# Patient Record
Sex: FEMALE | Race: WHITE | Employment: OTHER | URBAN - METROPOLITAN AREA
[De-identification: names, ages, dates, MRNs, and addresses within clinical notes are randomized per-mention and may not be internally consistent; named-entity substitution may affect disease eponyms.]

---

## 2018-07-24 ENCOUNTER — HOSPITAL ENCOUNTER (OUTPATIENT)
Dept: PREADMISSION TESTING | Age: 63
Discharge: HOME | End: 2018-07-24
Payer: COMMERCIAL

## 2018-07-24 VITALS — BODY MASS INDEX: 36.88 KG/M2 | HEIGHT: 67 IN | WEIGHT: 235 LBS

## 2018-07-24 LAB
ANION GAP SERPL CALC-SCNC: 7 MMOL/L (ref 3–18)
BUN SERPL-MCNC: 18 MG/DL (ref 7–18)
BUN/CREAT SERPL: 20 (ref 12–20)
CALCIUM SERPL-MCNC: 9.3 MG/DL (ref 8.5–10.1)
CHLORIDE SERPL-SCNC: 108 MMOL/L (ref 100–108)
CO2 SERPL-SCNC: 29 MMOL/L (ref 21–32)
CREAT SERPL-MCNC: 0.92 MG/DL (ref 0.6–1.3)
GLUCOSE SERPL-MCNC: 100 MG/DL (ref 74–99)
HCT VFR BLD AUTO: 33.7 % (ref 35–45)
HGB BLD-MCNC: 10.3 G/DL (ref 12–16)
POTASSIUM SERPL-SCNC: 3.5 MMOL/L (ref 3.5–5.5)
SODIUM SERPL-SCNC: 144 MMOL/L (ref 136–145)

## 2018-07-24 PROCEDURE — 93005 ELECTROCARDIOGRAM TRACING: CPT

## 2018-07-24 PROCEDURE — 85018 HEMOGLOBIN: CPT | Performed by: SURGERY

## 2018-07-24 PROCEDURE — 80048 BASIC METABOLIC PNL TOTAL CA: CPT | Performed by: SURGERY

## 2018-07-24 RX ORDER — CETIRIZINE HCL 10 MG
10 TABLET ORAL DAILY
COMMUNITY

## 2018-07-24 RX ORDER — OLMESARTAN MEDOXOMIL AND HYDROCHLOROTHIAZIDE 40/12.5 40; 12.5 MG/1; MG/1
1 TABLET ORAL DAILY
COMMUNITY

## 2018-07-24 RX ORDER — NAPROXEN SODIUM 220 MG
220 TABLET ORAL
COMMUNITY
End: 2022-04-13

## 2018-07-24 RX ORDER — ESOMEPRAZOLE MAGNESIUM 40 MG/1
20 CAPSULE, DELAYED RELEASE ORAL AS NEEDED
COMMUNITY

## 2018-07-24 RX ORDER — ACETAMINOPHEN 500 MG
1000 TABLET ORAL
COMMUNITY

## 2018-07-24 RX ORDER — SODIUM CHLORIDE, SODIUM LACTATE, POTASSIUM CHLORIDE, CALCIUM CHLORIDE 600; 310; 30; 20 MG/100ML; MG/100ML; MG/100ML; MG/100ML
125 INJECTION, SOLUTION INTRAVENOUS CONTINUOUS
Status: CANCELLED | OUTPATIENT
Start: 2018-07-24

## 2018-07-24 RX ORDER — HEPARIN SODIUM 5000 [USP'U]/ML
5000 INJECTION, SOLUTION INTRAVENOUS; SUBCUTANEOUS ONCE
Status: CANCELLED | OUTPATIENT
Start: 2018-07-24 | End: 2018-07-24

## 2018-07-24 RX ORDER — SENNOSIDES 8.6 MG/1
5 TABLET ORAL DAILY
COMMUNITY
End: 2019-12-10

## 2018-07-24 RX ORDER — SERTRALINE HYDROCHLORIDE 25 MG/1
25 TABLET, FILM COATED ORAL DAILY
COMMUNITY
End: 2022-04-13

## 2018-07-25 LAB
ATRIAL RATE: 73 BPM
CALCULATED P AXIS, ECG09: 60 DEGREES
CALCULATED R AXIS, ECG10: 18 DEGREES
CALCULATED T AXIS, ECG11: 42 DEGREES
DIAGNOSIS, 93000: NORMAL
P-R INTERVAL, ECG05: 190 MS
Q-T INTERVAL, ECG07: 402 MS
QRS DURATION, ECG06: 92 MS
QTC CALCULATION (BEZET), ECG08: 442 MS
VENTRICULAR RATE, ECG03: 73 BPM

## 2018-08-12 ENCOUNTER — ANESTHESIA EVENT (OUTPATIENT)
Dept: SURGERY | Age: 63
DRG: 331 | End: 2018-08-12
Payer: COMMERCIAL

## 2018-08-13 ENCOUNTER — ANESTHESIA (OUTPATIENT)
Dept: SURGERY | Age: 63
DRG: 331 | End: 2018-08-13
Payer: COMMERCIAL

## 2018-08-13 ENCOUNTER — HOSPITAL ENCOUNTER (INPATIENT)
Age: 63
LOS: 2 days | Discharge: HOME | DRG: 331 | End: 2018-08-15
Attending: SURGERY | Admitting: SURGERY
Payer: COMMERCIAL

## 2018-08-13 PROBLEM — K56.699 STRICTURE OF SIGMOID COLON (HCC): Status: ACTIVE | Noted: 2018-08-13

## 2018-08-13 PROBLEM — M19.90 ARTHRITIS: Status: ACTIVE | Noted: 2018-08-13

## 2018-08-13 PROBLEM — E78.5 HYPERLIPIDEMIA: Status: ACTIVE | Noted: 2018-08-13

## 2018-08-13 PROBLEM — Z98.84 HISTORY OF GASTRIC RESTRICTIVE SURGERY: Status: ACTIVE | Noted: 2018-08-13

## 2018-08-13 PROBLEM — I10 ESSENTIAL HYPERTENSION: Status: ACTIVE | Noted: 2018-08-13

## 2018-08-13 PROBLEM — M79.7 FIBROMYALGIA: Status: ACTIVE | Noted: 2018-08-13

## 2018-08-13 PROBLEM — K21.9 GERD (GASTROESOPHAGEAL REFLUX DISEASE): Status: ACTIVE | Noted: 2018-08-13

## 2018-08-13 LAB
ABO + RH BLD: NORMAL
BLOOD GROUP ANTIBODIES SERPL: NORMAL
SPECIMEN EXP DATE BLD: NORMAL

## 2018-08-13 PROCEDURE — 77030038020 HC MANFLD NEPTUNE STRY -B: Performed by: SURGERY

## 2018-08-13 PROCEDURE — 77030018719 HC DRSG PTCH ANTIMIC J&J -A: Performed by: SURGERY

## 2018-08-13 PROCEDURE — 74011000258 HC RX REV CODE- 258: Performed by: SURGERY

## 2018-08-13 PROCEDURE — 74011250636 HC RX REV CODE- 250/636: Performed by: ANESTHESIOLOGY

## 2018-08-13 PROCEDURE — 77030026102 HC DEV TISS ENSEAL G2 J&J -F: Performed by: SURGERY

## 2018-08-13 PROCEDURE — 74011250637 HC RX REV CODE- 250/637: Performed by: SURGERY

## 2018-08-13 PROCEDURE — 77030009978 HC RELD STPLR TCR J&J -B: Performed by: SURGERY

## 2018-08-13 PROCEDURE — 77030018836 HC SOL IRR NACL ICUM -A: Performed by: SURGERY

## 2018-08-13 PROCEDURE — 77030011264 HC ELECTRD BLD EXT COVD -A: Performed by: SURGERY

## 2018-08-13 PROCEDURE — 77030010030: Performed by: SURGERY

## 2018-08-13 PROCEDURE — 77030027876 HC STPLR ENDOSC FLX PWR J&J -G1: Performed by: SURGERY

## 2018-08-13 PROCEDURE — 76210000006 HC OR PH I REC 0.5 TO 1 HR: Performed by: SURGERY

## 2018-08-13 PROCEDURE — 74011250636 HC RX REV CODE- 250/636: Performed by: SURGERY

## 2018-08-13 PROCEDURE — 74011000250 HC RX REV CODE- 250: Performed by: SURGERY

## 2018-08-13 PROCEDURE — 88304 TISSUE EXAM BY PATHOLOGIST: CPT | Performed by: SURGERY

## 2018-08-13 PROCEDURE — 74011250636 HC RX REV CODE- 250/636

## 2018-08-13 PROCEDURE — 86900 BLOOD TYPING SEROLOGIC ABO: CPT | Performed by: SURGERY

## 2018-08-13 PROCEDURE — 77030013079 HC BLNKT BAIR HGGR 3M -A: Performed by: ANESTHESIOLOGY

## 2018-08-13 PROCEDURE — 77030032490 HC SLV COMPR SCD KNE COVD -B: Performed by: SURGERY

## 2018-08-13 PROCEDURE — 76010000137 HC OR TIME 5 TO 5.5 HR: Performed by: SURGERY

## 2018-08-13 PROCEDURE — 77010033678 HC OXYGEN DAILY

## 2018-08-13 PROCEDURE — 77030008477 HC STYL SATN SLP COVD -A: Performed by: ANESTHESIOLOGY

## 2018-08-13 PROCEDURE — 77030034849: Performed by: SURGERY

## 2018-08-13 PROCEDURE — 77030016151 HC PROTCTR LNS DFOG COVD -B: Performed by: SURGERY

## 2018-08-13 PROCEDURE — 65270000029 HC RM PRIVATE

## 2018-08-13 PROCEDURE — 77030013567 HC DRN WND RESERV BARD -A: Performed by: SURGERY

## 2018-08-13 PROCEDURE — 77030008602 HC TRCR ENDOSC EPATH J&J -B: Performed by: SURGERY

## 2018-08-13 PROCEDURE — 77030009527 HC GEL PRT SYS AMR -E: Performed by: SURGERY

## 2018-08-13 PROCEDURE — 77030016154: Performed by: SURGERY

## 2018-08-13 PROCEDURE — 36415 COLL VENOUS BLD VENIPUNCTURE: CPT | Performed by: SURGERY

## 2018-08-13 PROCEDURE — 0DBW0ZZ EXCISION OF PERITONEUM, OPEN APPROACH: ICD-10-PCS | Performed by: SURGERY

## 2018-08-13 PROCEDURE — 77030022703 HC LIGASURE  BLNT LAPSCP SEAL COVD -E: Performed by: SURGERY

## 2018-08-13 PROCEDURE — 77030008518 HC TBNG INSUF ENDO STRY -B: Performed by: SURGERY

## 2018-08-13 PROCEDURE — 77030025303 HC STPLR ENDOSC J&J -G: Performed by: SURGERY

## 2018-08-13 PROCEDURE — 77030012407 HC DRN WND BARD -B: Performed by: SURGERY

## 2018-08-13 PROCEDURE — 77030003029 HC SUT VCRL J&J -B: Performed by: SURGERY

## 2018-08-13 PROCEDURE — 76060000041 HC ANESTHESIA 5 TO 5.5 HR: Performed by: SURGERY

## 2018-08-13 PROCEDURE — 88305 TISSUE EXAM BY PATHOLOGIST: CPT | Performed by: SURGERY

## 2018-08-13 PROCEDURE — 77030037875 HC DRSG MEPILEX <16IN BORD MOLN -A: Performed by: SURGERY

## 2018-08-13 PROCEDURE — 77030002986 HC SUT PROL J&J -A: Performed by: SURGERY

## 2018-08-13 PROCEDURE — 77030036598 HC CARTDRG STPL RELD ECHELON FLX J&J -D: Performed by: SURGERY

## 2018-08-13 PROCEDURE — 77030027138 HC INCENT SPIROMETER -A

## 2018-08-13 PROCEDURE — 77030020782 HC GWN BAIR PAWS FLX 3M -B: Performed by: SURGERY

## 2018-08-13 PROCEDURE — 0DTN0ZZ RESECTION OF SIGMOID COLON, OPEN APPROACH: ICD-10-PCS | Performed by: SURGERY

## 2018-08-13 PROCEDURE — 77030011256 HC DRSG MEPILEX <16IN NO BORD MOLN -A: Performed by: SURGERY

## 2018-08-13 PROCEDURE — 77030011267 HC ELECTRD BLD COVD -A: Performed by: SURGERY

## 2018-08-13 PROCEDURE — C9290 INJ, BUPIVACAINE LIPOSOME: HCPCS | Performed by: SURGERY

## 2018-08-13 PROCEDURE — 77030031139 HC SUT VCRL2 J&J -A: Performed by: SURGERY

## 2018-08-13 PROCEDURE — 77030009403 HC ELECTRD ENDO MEGA -B: Performed by: SURGERY

## 2018-08-13 PROCEDURE — 77030018521 HC STPLR ENDOSCOPIC J&J -C: Performed by: SURGERY

## 2018-08-13 PROCEDURE — 0WQF4ZZ REPAIR ABDOMINAL WALL, PERCUTANEOUS ENDOSCOPIC APPROACH: ICD-10-PCS | Performed by: SURGERY

## 2018-08-13 PROCEDURE — 77030005521 HC CATH URETH FOL38 BARD -B: Performed by: SURGERY

## 2018-08-13 PROCEDURE — 88307 TISSUE EXAM BY PATHOLOGIST: CPT | Performed by: SURGERY

## 2018-08-13 PROCEDURE — 77030033138 HC SUT PGA STRATFX J&J -B: Performed by: SURGERY

## 2018-08-13 PROCEDURE — 77030002966 HC SUT PDS J&J -A: Performed by: SURGERY

## 2018-08-13 PROCEDURE — 88302 TISSUE EXAM BY PATHOLOGIST: CPT | Performed by: SURGERY

## 2018-08-13 PROCEDURE — 77030008603 HC TRCR ENDOSC EPATH J&J -C: Performed by: SURGERY

## 2018-08-13 PROCEDURE — 74011000250 HC RX REV CODE- 250

## 2018-08-13 PROCEDURE — 0DJD8ZZ INSPECTION OF LOWER INTESTINAL TRACT, VIA NATURAL OR ARTIFICIAL OPENING ENDOSCOPIC: ICD-10-PCS | Performed by: SURGERY

## 2018-08-13 PROCEDURE — 77030008683 HC TU ET CUF COVD -A: Performed by: ANESTHESIOLOGY

## 2018-08-13 RX ORDER — MIDAZOLAM HYDROCHLORIDE 1 MG/ML
INJECTION, SOLUTION INTRAMUSCULAR; INTRAVENOUS AS NEEDED
Status: DISCONTINUED | OUTPATIENT
Start: 2018-08-13 | End: 2018-08-13 | Stop reason: HOSPADM

## 2018-08-13 RX ORDER — HYDROMORPHONE HYDROCHLORIDE 2 MG/ML
INJECTION, SOLUTION INTRAMUSCULAR; INTRAVENOUS; SUBCUTANEOUS AS NEEDED
Status: DISCONTINUED | OUTPATIENT
Start: 2018-08-13 | End: 2018-08-13 | Stop reason: HOSPADM

## 2018-08-13 RX ORDER — HEPARIN SODIUM 5000 [USP'U]/ML
5000 INJECTION, SOLUTION INTRAVENOUS; SUBCUTANEOUS ONCE
Status: COMPLETED | OUTPATIENT
Start: 2018-08-13 | End: 2018-08-13

## 2018-08-13 RX ORDER — HEPARIN SODIUM 5000 [USP'U]/ML
5000 INJECTION, SOLUTION INTRAVENOUS; SUBCUTANEOUS EVERY 8 HOURS
Status: DISCONTINUED | OUTPATIENT
Start: 2018-08-13 | End: 2018-08-16 | Stop reason: HOSPADM

## 2018-08-13 RX ORDER — EPHEDRINE SULFATE/0.9% NACL/PF 25 MG/5 ML
SYRINGE (ML) INTRAVENOUS AS NEEDED
Status: DISCONTINUED | OUTPATIENT
Start: 2018-08-13 | End: 2018-08-13 | Stop reason: HOSPADM

## 2018-08-13 RX ORDER — OXYCODONE HYDROCHLORIDE 5 MG/1
5 TABLET ORAL ONCE
Status: DISCONTINUED | OUTPATIENT
Start: 2018-08-13 | End: 2018-08-13 | Stop reason: HOSPADM

## 2018-08-13 RX ORDER — FENTANYL CITRATE 50 UG/ML
INJECTION, SOLUTION INTRAMUSCULAR; INTRAVENOUS AS NEEDED
Status: DISCONTINUED | OUTPATIENT
Start: 2018-08-13 | End: 2018-08-13 | Stop reason: HOSPADM

## 2018-08-13 RX ORDER — LIDOCAINE HYDROCHLORIDE 20 MG/ML
INJECTION, SOLUTION EPIDURAL; INFILTRATION; INTRACAUDAL; PERINEURAL AS NEEDED
Status: DISCONTINUED | OUTPATIENT
Start: 2018-08-13 | End: 2018-08-13 | Stop reason: HOSPADM

## 2018-08-13 RX ORDER — FENTANYL CITRATE 50 UG/ML
25 INJECTION, SOLUTION INTRAMUSCULAR; INTRAVENOUS AS NEEDED
Status: DISCONTINUED | OUTPATIENT
Start: 2018-08-13 | End: 2018-08-13 | Stop reason: HOSPADM

## 2018-08-13 RX ORDER — NALOXONE HYDROCHLORIDE 0.4 MG/ML
0.1 INJECTION, SOLUTION INTRAMUSCULAR; INTRAVENOUS; SUBCUTANEOUS AS NEEDED
Status: DISCONTINUED | OUTPATIENT
Start: 2018-08-13 | End: 2018-08-13 | Stop reason: HOSPADM

## 2018-08-13 RX ORDER — ALBUTEROL SULFATE 0.83 MG/ML
2.5 SOLUTION RESPIRATORY (INHALATION) AS NEEDED
Status: DISCONTINUED | OUTPATIENT
Start: 2018-08-13 | End: 2018-08-13 | Stop reason: HOSPADM

## 2018-08-13 RX ORDER — SODIUM CHLORIDE 0.9 % (FLUSH) 0.9 %
5-10 SYRINGE (ML) INJECTION AS NEEDED
Status: DISCONTINUED | OUTPATIENT
Start: 2018-08-13 | End: 2018-08-16 | Stop reason: HOSPADM

## 2018-08-13 RX ORDER — MAGNESIUM SULFATE 100 %
4 CRYSTALS MISCELLANEOUS AS NEEDED
Status: DISCONTINUED | OUTPATIENT
Start: 2018-08-13 | End: 2018-08-13 | Stop reason: HOSPADM

## 2018-08-13 RX ORDER — SODIUM CHLORIDE 0.9 % (FLUSH) 0.9 %
5-10 SYRINGE (ML) INJECTION AS NEEDED
Status: DISCONTINUED | OUTPATIENT
Start: 2018-08-13 | End: 2018-08-13 | Stop reason: HOSPADM

## 2018-08-13 RX ORDER — DIPHENHYDRAMINE HYDROCHLORIDE 50 MG/ML
12.5 INJECTION, SOLUTION INTRAMUSCULAR; INTRAVENOUS
Status: DISCONTINUED | OUTPATIENT
Start: 2018-08-13 | End: 2018-08-13 | Stop reason: HOSPADM

## 2018-08-13 RX ORDER — ONDANSETRON 2 MG/ML
4 INJECTION INTRAMUSCULAR; INTRAVENOUS ONCE
Status: DISCONTINUED | OUTPATIENT
Start: 2018-08-13 | End: 2018-08-13 | Stop reason: HOSPADM

## 2018-08-13 RX ORDER — KETOROLAC TROMETHAMINE 30 MG/ML
30 INJECTION, SOLUTION INTRAMUSCULAR; INTRAVENOUS EVERY 6 HOURS
Status: DISCONTINUED | OUTPATIENT
Start: 2018-08-13 | End: 2018-08-16 | Stop reason: HOSPADM

## 2018-08-13 RX ORDER — GABAPENTIN 100 MG/1
200 CAPSULE ORAL 3 TIMES DAILY
Status: DISCONTINUED | OUTPATIENT
Start: 2018-08-13 | End: 2018-08-16 | Stop reason: HOSPADM

## 2018-08-13 RX ORDER — SODIUM CHLORIDE 0.9 % (FLUSH) 0.9 %
5-10 SYRINGE (ML) INJECTION EVERY 8 HOURS
Status: DISCONTINUED | OUTPATIENT
Start: 2018-08-13 | End: 2018-08-16 | Stop reason: HOSPADM

## 2018-08-13 RX ORDER — ROCURONIUM BROMIDE 10 MG/ML
INJECTION, SOLUTION INTRAVENOUS AS NEEDED
Status: DISCONTINUED | OUTPATIENT
Start: 2018-08-13 | End: 2018-08-13 | Stop reason: HOSPADM

## 2018-08-13 RX ORDER — HYDROCODONE BITARTRATE AND ACETAMINOPHEN 7.5; 325 MG/1; MG/1
1 TABLET ORAL
Status: DISCONTINUED | OUTPATIENT
Start: 2018-08-13 | End: 2018-08-13

## 2018-08-13 RX ORDER — DEXTROSE 50 % IN WATER (D50W) INTRAVENOUS SYRINGE
25-50 AS NEEDED
Status: DISCONTINUED | OUTPATIENT
Start: 2018-08-13 | End: 2018-08-13 | Stop reason: HOSPADM

## 2018-08-13 RX ORDER — INSULIN LISPRO 100 [IU]/ML
INJECTION, SOLUTION INTRAVENOUS; SUBCUTANEOUS ONCE
Status: DISCONTINUED | OUTPATIENT
Start: 2018-08-13 | End: 2018-08-13 | Stop reason: HOSPADM

## 2018-08-13 RX ORDER — KETOROLAC TROMETHAMINE 30 MG/ML
INJECTION, SOLUTION INTRAMUSCULAR; INTRAVENOUS AS NEEDED
Status: DISCONTINUED | OUTPATIENT
Start: 2018-08-13 | End: 2018-08-13 | Stop reason: HOSPADM

## 2018-08-13 RX ORDER — DEXTROSE MONOHYDRATE AND SODIUM CHLORIDE 5; .45 G/100ML; G/100ML
125 INJECTION, SOLUTION INTRAVENOUS CONTINUOUS
Status: DISCONTINUED | OUTPATIENT
Start: 2018-08-13 | End: 2018-08-14

## 2018-08-13 RX ORDER — SODIUM CHLORIDE, SODIUM LACTATE, POTASSIUM CHLORIDE, CALCIUM CHLORIDE 600; 310; 30; 20 MG/100ML; MG/100ML; MG/100ML; MG/100ML
125 INJECTION, SOLUTION INTRAVENOUS CONTINUOUS
Status: DISCONTINUED | OUTPATIENT
Start: 2018-08-13 | End: 2018-08-14

## 2018-08-13 RX ORDER — ACETAMINOPHEN 10 MG/ML
1000 INJECTION, SOLUTION INTRAVENOUS EVERY 8 HOURS
Status: COMPLETED | OUTPATIENT
Start: 2018-08-13 | End: 2018-08-15

## 2018-08-13 RX ORDER — ONDANSETRON 2 MG/ML
4 INJECTION INTRAMUSCULAR; INTRAVENOUS
Status: DISCONTINUED | OUTPATIENT
Start: 2018-08-13 | End: 2018-08-16 | Stop reason: HOSPADM

## 2018-08-13 RX ORDER — SODIUM CHLORIDE, SODIUM LACTATE, POTASSIUM CHLORIDE, CALCIUM CHLORIDE 600; 310; 30; 20 MG/100ML; MG/100ML; MG/100ML; MG/100ML
150 INJECTION, SOLUTION INTRAVENOUS CONTINUOUS
Status: DISCONTINUED | OUTPATIENT
Start: 2018-08-13 | End: 2018-08-13 | Stop reason: HOSPADM

## 2018-08-13 RX ORDER — NEOSTIGMINE METHYLSULFATE 5 MG/5 ML
SYRINGE (ML) INTRAVENOUS AS NEEDED
Status: DISCONTINUED | OUTPATIENT
Start: 2018-08-13 | End: 2018-08-13 | Stop reason: HOSPADM

## 2018-08-13 RX ORDER — ONDANSETRON 2 MG/ML
INJECTION INTRAMUSCULAR; INTRAVENOUS AS NEEDED
Status: DISCONTINUED | OUTPATIENT
Start: 2018-08-13 | End: 2018-08-13 | Stop reason: HOSPADM

## 2018-08-13 RX ORDER — GLYCOPYRROLATE 0.2 MG/ML
INJECTION INTRAMUSCULAR; INTRAVENOUS AS NEEDED
Status: DISCONTINUED | OUTPATIENT
Start: 2018-08-13 | End: 2018-08-13 | Stop reason: HOSPADM

## 2018-08-13 RX ORDER — ACETAMINOPHEN 10 MG/ML
1000 INJECTION, SOLUTION INTRAVENOUS ONCE
Status: COMPLETED | OUTPATIENT
Start: 2018-08-13 | End: 2018-08-13

## 2018-08-13 RX ORDER — KETAMINE HYDROCHLORIDE 10 MG/ML
INJECTION, SOLUTION INTRAMUSCULAR; INTRAVENOUS AS NEEDED
Status: DISCONTINUED | OUTPATIENT
Start: 2018-08-13 | End: 2018-08-13 | Stop reason: HOSPADM

## 2018-08-13 RX ORDER — PROPOFOL 10 MG/ML
INJECTION, EMULSION INTRAVENOUS AS NEEDED
Status: DISCONTINUED | OUTPATIENT
Start: 2018-08-13 | End: 2018-08-13 | Stop reason: HOSPADM

## 2018-08-13 RX ADMIN — ROCURONIUM BROMIDE 10 MG: 10 INJECTION, SOLUTION INTRAVENOUS at 11:08

## 2018-08-13 RX ADMIN — Medication 10 ML: at 23:46

## 2018-08-13 RX ADMIN — HEPARIN SODIUM 5000 UNITS: 5000 INJECTION, SOLUTION INTRAVENOUS; SUBCUTANEOUS at 07:05

## 2018-08-13 RX ADMIN — ROCURONIUM BROMIDE 10 MG: 10 INJECTION, SOLUTION INTRAVENOUS at 11:34

## 2018-08-13 RX ADMIN — SODIUM CHLORIDE, SODIUM LACTATE, POTASSIUM CHLORIDE, AND CALCIUM CHLORIDE: 600; 310; 30; 20 INJECTION, SOLUTION INTRAVENOUS at 11:45

## 2018-08-13 RX ADMIN — GABAPENTIN 200 MG: 100 CAPSULE ORAL at 22:41

## 2018-08-13 RX ADMIN — KETAMINE HYDROCHLORIDE 10 MG: 10 INJECTION, SOLUTION INTRAMUSCULAR; INTRAVENOUS at 09:44

## 2018-08-13 RX ADMIN — GLYCOPYRROLATE 0.2 MG: 0.2 INJECTION INTRAMUSCULAR; INTRAVENOUS at 08:34

## 2018-08-13 RX ADMIN — KETAMINE HYDROCHLORIDE 10 MG: 10 INJECTION, SOLUTION INTRAMUSCULAR; INTRAVENOUS at 08:34

## 2018-08-13 RX ADMIN — ROCURONIUM BROMIDE 10 MG: 10 INJECTION, SOLUTION INTRAVENOUS at 09:15

## 2018-08-13 RX ADMIN — PROPOFOL 30 MG: 10 INJECTION, EMULSION INTRAVENOUS at 12:25

## 2018-08-13 RX ADMIN — FENTANYL CITRATE 75 MCG: 50 INJECTION, SOLUTION INTRAMUSCULAR; INTRAVENOUS at 08:10

## 2018-08-13 RX ADMIN — KETAMINE HYDROCHLORIDE 10 MG: 10 INJECTION, SOLUTION INTRAMUSCULAR; INTRAVENOUS at 11:55

## 2018-08-13 RX ADMIN — MIDAZOLAM HYDROCHLORIDE 2 MG: 1 INJECTION, SOLUTION INTRAMUSCULAR; INTRAVENOUS at 08:04

## 2018-08-13 RX ADMIN — KETAMINE HYDROCHLORIDE 10 MG: 10 INJECTION, SOLUTION INTRAMUSCULAR; INTRAVENOUS at 08:43

## 2018-08-13 RX ADMIN — ROCURONIUM BROMIDE 10 MG: 10 INJECTION, SOLUTION INTRAVENOUS at 09:32

## 2018-08-13 RX ADMIN — ACETAMINOPHEN 1000 MG: 10 INJECTION, SOLUTION INTRAVENOUS at 08:07

## 2018-08-13 RX ADMIN — ONDANSETRON 4 MG: 2 INJECTION INTRAMUSCULAR; INTRAVENOUS at 12:24

## 2018-08-13 RX ADMIN — KETAMINE HYDROCHLORIDE 10 MG: 10 INJECTION, SOLUTION INTRAMUSCULAR; INTRAVENOUS at 09:18

## 2018-08-13 RX ADMIN — GABAPENTIN 200 MG: 100 CAPSULE ORAL at 17:12

## 2018-08-13 RX ADMIN — FENTANYL CITRATE 25 MCG: 50 INJECTION, SOLUTION INTRAMUSCULAR; INTRAVENOUS at 10:37

## 2018-08-13 RX ADMIN — LIDOCAINE HYDROCHLORIDE 40 MG: 20 INJECTION, SOLUTION EPIDURAL; INFILTRATION; INTRACAUDAL; PERINEURAL at 08:12

## 2018-08-13 RX ADMIN — ACETAMINOPHEN 1000 MG: 10 INJECTION, SOLUTION INTRAVENOUS at 23:43

## 2018-08-13 RX ADMIN — ROCURONIUM BROMIDE 10 MG: 10 INJECTION, SOLUTION INTRAVENOUS at 08:49

## 2018-08-13 RX ADMIN — DEXTROSE MONOHYDRATE AND SODIUM CHLORIDE 125 ML/HR: 5; .45 INJECTION, SOLUTION INTRAVENOUS at 18:13

## 2018-08-13 RX ADMIN — KETOROLAC TROMETHAMINE 30 MG: 30 INJECTION, SOLUTION INTRAMUSCULAR at 22:50

## 2018-08-13 RX ADMIN — Medication 10 ML: at 17:13

## 2018-08-13 RX ADMIN — KETAMINE HYDROCHLORIDE 10 MG: 10 INJECTION, SOLUTION INTRAMUSCULAR; INTRAVENOUS at 12:30

## 2018-08-13 RX ADMIN — FENTANYL CITRATE 25 MCG: 50 INJECTION, SOLUTION INTRAMUSCULAR; INTRAVENOUS at 09:17

## 2018-08-13 RX ADMIN — Medication 3 MG: at 12:53

## 2018-08-13 RX ADMIN — ROCURONIUM BROMIDE 10 MG: 10 INJECTION, SOLUTION INTRAVENOUS at 10:03

## 2018-08-13 RX ADMIN — PROPOFOL 150 MG: 10 INJECTION, EMULSION INTRAVENOUS at 08:12

## 2018-08-13 RX ADMIN — HEPARIN SODIUM 5000 UNITS: 5000 INJECTION INTRAVENOUS; SUBCUTANEOUS at 23:00

## 2018-08-13 RX ADMIN — KETOROLAC TROMETHAMINE 30 MG: 30 INJECTION, SOLUTION INTRAMUSCULAR; INTRAVENOUS at 12:32

## 2018-08-13 RX ADMIN — SODIUM CHLORIDE 1 G: 900 INJECTION INTRAVENOUS at 08:13

## 2018-08-13 RX ADMIN — ROCURONIUM BROMIDE 20 MG: 10 INJECTION, SOLUTION INTRAVENOUS at 08:33

## 2018-08-13 RX ADMIN — KETAMINE HYDROCHLORIDE 10 MG: 10 INJECTION, SOLUTION INTRAMUSCULAR; INTRAVENOUS at 10:25

## 2018-08-13 RX ADMIN — GLYCOPYRROLATE 0.2 MG: 0.2 INJECTION INTRAMUSCULAR; INTRAVENOUS at 08:27

## 2018-08-13 RX ADMIN — KETAMINE HYDROCHLORIDE 10 MG: 10 INJECTION, SOLUTION INTRAMUSCULAR; INTRAVENOUS at 12:20

## 2018-08-13 RX ADMIN — SODIUM CHLORIDE, SODIUM LACTATE, POTASSIUM CHLORIDE, AND CALCIUM CHLORIDE: 600; 310; 30; 20 INJECTION, SOLUTION INTRAVENOUS at 08:45

## 2018-08-13 RX ADMIN — ACETAMINOPHEN 1000 MG: 10 INJECTION, SOLUTION INTRAVENOUS at 17:12

## 2018-08-13 RX ADMIN — ROCURONIUM BROMIDE 5 MG: 10 INJECTION, SOLUTION INTRAVENOUS at 12:14

## 2018-08-13 RX ADMIN — SODIUM CHLORIDE, SODIUM LACTATE, POTASSIUM CHLORIDE, AND CALCIUM CHLORIDE: 600; 310; 30; 20 INJECTION, SOLUTION INTRAVENOUS at 08:04

## 2018-08-13 RX ADMIN — Medication 20 MG: at 08:30

## 2018-08-13 RX ADMIN — ROCURONIUM BROMIDE 30 MG: 10 INJECTION, SOLUTION INTRAVENOUS at 08:13

## 2018-08-13 RX ADMIN — KETAMINE HYDROCHLORIDE 10 MG: 10 INJECTION, SOLUTION INTRAMUSCULAR; INTRAVENOUS at 11:37

## 2018-08-13 RX ADMIN — HYDROMORPHONE HYDROCHLORIDE 0.5 MG: 2 INJECTION, SOLUTION INTRAMUSCULAR; INTRAVENOUS; SUBCUTANEOUS at 13:10

## 2018-08-13 RX ADMIN — HEPARIN SODIUM 5000 UNITS: 5000 INJECTION INTRAVENOUS; SUBCUTANEOUS at 17:12

## 2018-08-13 RX ADMIN — SODIUM CHLORIDE, SODIUM LACTATE, POTASSIUM CHLORIDE, AND CALCIUM CHLORIDE 125 ML/HR: 600; 310; 30; 20 INJECTION, SOLUTION INTRAVENOUS at 07:05

## 2018-08-13 RX ADMIN — ROCURONIUM BROMIDE 10 MG: 10 INJECTION, SOLUTION INTRAVENOUS at 10:30

## 2018-08-13 RX ADMIN — FENTANYL CITRATE 50 MCG: 50 INJECTION, SOLUTION INTRAMUSCULAR; INTRAVENOUS at 09:40

## 2018-08-13 RX ADMIN — KETAMINE HYDROCHLORIDE 10 MG: 10 INJECTION, SOLUTION INTRAMUSCULAR; INTRAVENOUS at 08:49

## 2018-08-13 RX ADMIN — LIDOCAINE HYDROCHLORIDE 40 MG: 20 INJECTION, SOLUTION EPIDURAL; INFILTRATION; INTRACAUDAL; PERINEURAL at 11:15

## 2018-08-13 RX ADMIN — FENTANYL CITRATE 25 MCG: 50 INJECTION, SOLUTION INTRAMUSCULAR; INTRAVENOUS at 10:24

## 2018-08-13 NOTE — BRIEF OP NOTE
BRIEF OPERATIVE NOTE    Date of Procedure: 8/13/2018   Preoperative Diagnosis: DIVERTICULITIS WITH SIGMOID STRICTURE  Postoperative Diagnosis: DIVERTICULITIS WITH SIGMOID STRICTURE    Procedure(s):  LAPAROSCOPIC LOW ANTERIOR RESECTION, MOBILIZATION OF SPLENIC FLEXURE, AND VENTRAL HERNIA REPAIR, BILATERAL TAP BLOCK, ABLATION OF PERITONEAL ENDOMETRIOSIS  Surgeon(s) and Role:     * Clance Drain, DO - Primary    Surgical Staff:  Circ-1: David Espinal RN  Circ-Relief: Porter Mosqueda; Jonelle Merlin, RN  Scrub Tech-1: Nisha Doyle  Scrub Tech-Relief: Antonio Paige  Surg Asst-1: Raymond Wood  Surg Asst-Relief: Idania Portillo  Float Staff: Jonelle Merlin, RN  Event Time In   Incision Start 0848   Incision Close 1314     Anesthesia: General   Estimated Blood Loss: MINIMAL <25CC  Specimens:   ID Type Source Tests Collected by Time Destination   1 : umbilical hernia and sac Preservative Abdomen  Clance Drain, DO 8/13/2018 0932 Pathology   2 : peritoneal nodule Preservative Peritoneum  Clance Drain, DO 8/13/2018 1021 Pathology   3 : sigmoid Preservative Sigmoid  Clance Drain, DO 8/13/2018 1101 Pathology   4 : proximal and distal donuts Preservative Colon  Clance Drain, DO 8/13/2018 1215 Pathology      Findings: evidence of diverticulitis, peritoneal nodule, endometriosis of pelvis  Complications: none  Implants: * No implants in log *

## 2018-08-13 NOTE — PROGRESS NOTES
TRANSFER - IN REPORT:    Verbal report received from SOLO Vera RN on Audelia Nicholas  being received from PACU for routine post - op      Report consisted of patients Situation, Background, Assessment and   Recommendations(SBAR). Information from the following report(s) SBAR, Kardex, Procedure Summary, Intake/Output, MAR and Recent Results was reviewed with the receiving nurse. Opportunity for questions and clarification was provided. Assessment will be completed upon patients arrival to unit and care assumed.

## 2018-08-13 NOTE — ANESTHESIA POSTPROCEDURE EVALUATION
Post-Anesthesia Evaluation & Assessment    Visit Vitals    /50    Pulse 72    Temp 36.7 °C (98 °F)    Resp 14    Ht 5' 7\" (1.702 m)    Wt 103.6 kg (228 lb 5 oz)    SpO2 96%    BMI 35.76 kg/m2       Nausea/Vomiting: no nausea    Post-operative hydration adequate. Pain score (VAS): 2    Mental status & Level of consciousness: alert and oriented x 3    Neurological status: moves all extremities, sensation grossly intact    Pulmonary status: airway patent, no supplemental oxygen required    Complications related to anesthesia: none    Patient has met all discharge requirements.     Additional comments:        Michelle Amaya MD

## 2018-08-13 NOTE — INTERVAL H&P NOTE
H&P Update:  Charly Damon was seen and examined. History and physical has been reviewed. The patient has been examined. There have been no significant clinical changes since the completion of the originally dated History and Physical.  Patient identified by surgeon; surgical site was confirmed by patient and surgeon.     Signed By: Jennifer Garcia DO     August 13, 2018 7:51 AM

## 2018-08-13 NOTE — IP AVS SNAPSHOT
[address] [place] [id number] 
[phone] Patient: Melecio Rm MRN: [id number] XBP:[birth year] A check yessenia indicates which time of day the medication should be taken. My Medications START taking these medications Instructions Each Dose to Equal  
 Morning Noon Evening Bedtime  
 gabapentin 100 mg capsule Commonly known as:  NEURONTIN Your last dose was: Your next dose is: Take 2 Caps by mouth three (3) times daily. Indications: Postoperative Acute Pain 200 mg  
    
   
   
   
  
 ibuprofen 800 mg tablet Commonly known as:  MOTRIN Your last dose was: Your next dose is: Take 1 Tab by mouth every eight (8) hours as needed for Pain. 800 mg CONTINUE taking these medications Instructions Each Dose to Equal  
 Morning Noon Evening Bedtime ALEVE 220 mg tablet Generic drug:  naproxen sodium Your last dose was: Your next dose is: Take 220 mg by mouth two (2) times daily as needed. 220 mg DULoxetine 60 mg capsule Commonly known as:  CYMBALTA Your last dose was: Your next dose is: Take 60 mg by mouth daily. Indications: FIBROMYALGIA  
 60 mg NexIUM 40 mg capsule Generic drug:  esomeprazole Your last dose was: Your next dose is: Take 40 mg by mouth daily. 40 mg  
    
   
   
   
  
 olmesartan-hydroCHLOROthiazide 40-12.5 mg per tablet Commonly known as:  BENICAR HCT Your last dose was: Your next dose is: Take 1 Tab by mouth daily. 1 Tab * Senna 8.6 mg tablet Generic drug:  senna Your last dose was: Your next dose is: Take 5 Tabs by mouth daily. 5 Tab * Ex-Lax 15 mg tablet Generic drug:  sennosides Your last dose was: Your next dose is: Take 1-3 Tabs by mouth daily as needed for Constipation. 1-3 Tab  
    
   
   
   
  
 sertraline 25 mg tablet Commonly known as:  ZOLOFT Your last dose was: Your next dose is: Take 25 mg by mouth daily. 25 mg  
    
   
   
   
  
 simvastatin 10 mg tablet Commonly known as:  ZOCOR Your last dose was: Your next dose is: Take  by mouth daily. Indications: HYPERCHOLESTEROLEMIA  
     
   
   
   
  
 TYLENOL EXTRA STRENGTH 500 mg tablet Generic drug:  acetaminophen Your last dose was: Your next dose is: Take 1,000 mg by mouth every six (6) hours as needed for Pain. 1000 mg  
    
   
   
   
  
 ZyrTEC 10 mg tablet Generic drug:  cetirizine Your last dose was: Your next dose is: Take 10 mg by mouth daily. 10 mg  
    
   
   
   
  
 * Notice: This list has 2 medication(s) that are the same as other medications prescribed for you. Read the directions carefully, and ask your doctor or other care provider to review them with you. Where to Get Your Medications Information on where to get these meds will be given to you by the nurse or doctor. ! Ask your nurse or doctor about these medications  
  gabapentin 100 mg capsule  
 ibuprofen 800 mg tablet

## 2018-08-13 NOTE — IP AVS SNAPSHOT
Segundo Rodríguez 
 
 
 [address] [facility] [address] 
[phone] Patient: Olive Bond MRN: [id number] GDV:[id number] About your hospitalization You were admitted on:  August 13, 2018 You last received care in the:  [address] [place] [address] You were discharged on:  August 15, 2018 Why you were hospitalized Your primary diagnosis was:  Stricture Of Sigmoid Colon (Hcc) Your diagnoses also included:  Gerd (Gastroesophageal Reflux Disease), Essential Hypertension, Hyperlipidemia, History Of Gastric Restrictive Surgery, Fibromyalgia, Arthritis Follow-up Information Follow up With Details Comments Contact Info Dangelo Erazo DO Schedule an appointment as soon as possible for a visit For wound re-check in 2 weeks [address] [place] [address] [address] [facility] 
[phone] Rufino Stokes MD     
  
Discharge Orders None A check yessenia indicates which time of day the medication should be taken. My Medications START taking these medications Instructions Each Dose to Equal  
 Morning Noon Evening Bedtime  
 gabapentin 100 mg capsule Commonly known as:  NEURONTIN Your last dose was: Your next dose is: Take 2 Caps by mouth three (3) times daily. Indications: Postoperative Acute Pain 200 mg  
    
   
   
   
  
 ibuprofen 800 mg tablet Commonly known as:  MOTRIN Your last dose was: Your next dose is: Take 1 Tab by mouth every eight (8) hours as needed for Pain. 800 mg CONTINUE taking these medications Instructions Each Dose to Equal  
 Morning Noon Evening Bedtime ALEVE 220 mg tablet Generic drug:  naproxen sodium Your last dose was: Your next dose is: Take 220 mg by mouth two (2) times daily as needed. 220 mg DULoxetine 60 mg capsule Commonly known as:  CYMBALTA Your last dose was: Your next dose is: Take 60 mg by mouth daily. Indications: FIBROMYALGIA  
 60 mg NexIUM 40 mg capsule Generic drug:  esomeprazole Your last dose was: Your next dose is: Take 40 mg by mouth daily. 40 mg  
    
   
   
   
  
 olmesartan-hydroCHLOROthiazide 40-12.5 mg per tablet Commonly known as:  BENICAR HCT Your last dose was: Your next dose is: Take 1 Tab by mouth daily. 1 Tab * Senna 8.6 mg tablet Generic drug:  senna Your last dose was: Your next dose is: Take 5 Tabs by mouth daily. 5 Tab * Ex-Lax 15 mg tablet Generic drug:  sennosides Your last dose was: Your next dose is: Take 1-3 Tabs by mouth daily as needed for Constipation. 1-3 Tab  
    
   
   
   
  
 sertraline 25 mg tablet Commonly known as:  ZOLOFT Your last dose was: Your next dose is: Take 25 mg by mouth daily. 25 mg  
    
   
   
   
  
 simvastatin 10 mg tablet Commonly known as:  ZOCOR Your last dose was: Your next dose is: Take  by mouth daily. Indications: HYPERCHOLESTEROLEMIA  
     
   
   
   
  
 TYLENOL EXTRA STRENGTH 500 mg tablet Generic drug:  acetaminophen Your last dose was: Your next dose is: Take 1,000 mg by mouth every six (6) hours as needed for Pain. 1000 mg  
    
   
   
   
  
 ZyrTEC 10 mg tablet Generic drug:  cetirizine Your last dose was: Your next dose is: Take 10 mg by mouth daily. 10 mg  
    
   
   
   
  
 * Notice: This list has 2 medication(s) that are the same as other medications prescribed for you. Read the directions carefully, and ask your doctor or other care provider to review them with you. Where to Get Your Medications Information on where to get these meds will be given to you by the nurse or doctor. ! Ask your nurse or doctor about these medications  
  gabapentin 100 mg capsule  
 ibuprofen 800 mg tablet Discharge Instructions Diverticulitis: Care Instructions Your Care Instructions Diverticulitis occurs when pouches form in the wall of the colon and become inflamed or infected. It can be very painful. Doctors aren't sure what causes diverticulitis. There is no proof that foods such as nuts, seeds, or berries cause it or make it worse. A low-fiber diet may cause the colon to work harder to push stool forward. Pouches may form because of this extra work. It may be hard to think about healthy eating while you're in pain. But as you recover, you might think about how you can use healthy eating for overall better health. Healthy eating may help you avoid future attacks. Follow-up care is a key part of your treatment and safety. Be sure to make and go to all appointments, and call your doctor if you are having problems. It's also a good idea to know your test results and keep a list of the medicines you take. How can you care for yourself at home? · Drink plenty of fluids, enough so that your urine is light yellow or clear like water. If you have kidney, heart, or liver disease and have to limit fluids, talk with your doctor before you increase the amount of fluids you drink. · Stick to liquids or a bland diet (plain rice, bananas, dry toast or crackers, applesauce) until you are feeling better. Then you can return to regular foods and gradually increase the amount of fiber in your diet. · Use a heating pad set on low on your belly to relieve mild cramps and pain. · Get extra rest until you are feeling better. · Be safe with medicines. Read and follow all instructions on the label.  
¨ If the doctor gave you a prescription medicine for pain, take it as prescribed. ¨ If you are not taking a prescription pain medicine, ask your doctor if you can take an over-the-counter medicine. · If your doctor prescribed antibiotics, take them as directed. Do not stop taking them just because you feel better. You need to take the full course of antibiotics. To prevent future attacks of diverticulitis · Avoid constipation: 
¨ Include fruits, vegetables, beans, and whole grains in your diet each day. These foods are high in fiber. ¨ Drink plenty of fluids, enough so that your urine is light yellow or clear like water. If you have kidney, heart, or liver disease and have to limit fluids, talk with your doctor before you increase the amount of fluids you drink. ¨ Get some exercise every day. Build up slowly to 30 to 60 minutes a day on 5 or more days of the week. ¨ Take a fiber supplement, such as Citrucel or Metamucil, every day if needed. Read and follow all instructions on the label. ¨ Schedule time each day for a bowel movement. Having a daily routine may help. Take your time and do not strain when having a bowel movement. When should you call for help? Call your doctor now or seek immediate medical care if: 
  · You have a fever.  
  · You are vomiting.  
  · You have new or worse belly pain.  
  · You cannot pass stools or gas.  
 Watch closely for changes in your health, and be sure to contact your doctor if you have any problems. Where can you learn more? Go to http://kasey-ignacio.info/. Enter H901 in the search box to learn more about \"Diverticulitis: Care Instructions. \" Current as of: May 12, 2017 Content Version: 11.7 © 2723-7292 [employer]. Care instructions adapted under license by [employer] (which disclaims liability or warranty for this information).  If you have questions about a medical condition or this instruction, always ask your healthcare professional. [employer] disclaims any warranty or liability for your use of this information. Post-Operative Discharge Instructions Pawel Benson DO, FASCRS, [facility] [address] [place] [address], [address] [place] [address],  Thalia Menendez 
[phone] Patient: Hayder Albarado MRN: [id number]  CSN: [id number] YOB: 1955  Age: 58 y.o. Sex: female DOA: 8/13/2018 LOS:  LOS: 2 days   Discharge Date:   
 
Acute Diagnoses: DIVERTICULITIS WITH SIGMOID STRICTURE Stricture of sigmoid colon ([facility] [address] Chronic Medical Diagnoses: 
Problem List as of 8/15/2018  Date Reviewed: 8/13/2018 Codes Class Noted - Resolved * (Principal)Stricture of sigmoid colon ([facility] ICD-10-CM: O73.146 ICD-9-CM: 560.9  8/13/2018 - Present GERD (gastroesophageal reflux disease) ICD-10-CM: K21.9 ICD-9-CM: 530.81  8/13/2018 - Present Essential hypertension ICD-10-CM: I10 
ICD-9-CM: 401.9  8/13/2018 - Present Hyperlipidemia ICD-10-CM: E78.5 ICD-9-CM: 272.4  8/13/2018 - Present History of gastric restrictive surgery ICD-10-CM: Z98.84 ICD-9-CM: V45.86  8/13/2018 - Present Fibromyalgia ICD-10-CM: M79.7 ICD-9-CM: 729.1  8/13/2018 - Present Arthritis ICD-10-CM: M19.90 ICD-9-CM: 716.90  8/13/2018 - Present Overview Signed 8/13/2018  3:44 PM by Pawel Benson DO  
  spondylosis Diet 1. Resume prior to surgery diet as tolerated. Activity 1. Do not drive a car or operate any hazardous machinery the day of surgery. 2. Rest quietly today. 3. No bending or heavy lifting. 4. You may resume other prior to surgery activities as tolerated. 5. You may remove the bandage and shower in 1 day. Drain / Wound Care 1. Follow all drain / wound care instructions exactly as explained by the Nurse at time of discharge. 2. Apply an ice pack to the surgical site for 48 hours. 3. Do not put any salves or ointments on the wound. Allow it to form a dry scab. 4. Leave steri-strips / Dermabond alone. They should be allowed to fall off on their own in 7-14 days. Medications 1. It is important to take your medications exactly as they are prescribed. 2. Keep your medication in the bottles provided by the pharmacist, and keep a list of the medication names, dosages, and times they should be taken in your wallet. Call 911 anytime you think you may need emergency care. For example, call if: 
· You passed out (lost consciousness). · You have severe trouble breathing. · You have sudden chest pain and shortness of breath. Notify your Surgeon for any of the followin. Fever, chills, nausea, vomiting, severe abdominal pain or bleeding. 2. If you experience any redness or discharge or sign of infection. 3. Persistent nausea lasting more than 24 hours. If you are unable to reach your Surgeon for any of the symptoms above, you should proceed directly to the nearest Emergency Department. Post-Operative Appointment Information Call Dr. Sonido Trevino office tomorrow morning at [phone] to schedule a post-operative office visit in two (2) weeks. If any questions or concerns arise, call your Surgeon at [phone]. [employer] Announcement We are excited to announce that we are making your provider's discharge notes available to you in [employer]. You will see these notes when they are completed and signed by the physician that discharged you from your recent hospital stay. If you have any questions or concerns about any information you see in [employer], please call the Health Information Department where you were seen or reach out to your Primary Care Provider for more information about your plan of care. Introducing [facility]! Dear Riri Sanchez: Thank you for requesting a [employer] account. Our records indicate that you already have an active [employer] account.   You can access your account anytime at [email or web address] Did you know that you can access your hospital and ER discharge instructions at any time in [employer]? You can also review all of your test results from your hospital stay or ER visit. Additional Information If you have questions, please visit the Frequently Asked Questions section of the [employer] website at [email or web address] Remember, [employer] is NOT to be used for urgent needs. For medical emergencies, dial 911. Now available from your iPhone and Android! Introducing [employer] As a José Flynn patient, I wanted to make you aware of our electronic visit tool called Be[employer]s. [facility] 24/7 allows you to connect within minutes with a medical provider 24 hours a day, seven days a week via a mobile device or tablet or logging into a secure website from your computer. You can access Bereket Walls from anywhere in the [place]. A virtual visit might be right for you when you have a simple condition and feel like you just dont want to get out of bed, or cant get away from work for an appointment, when your regular José Rina provider is not available (evenings, weekends or holidays), or when youre out of town and need minor care. Electronic visits cost only $49 and if the José Rina 24/7 provider determines a prescription is needed to treat your condition, one can be electronically transmitted to a nearby pharmacy*. Please take a moment to enroll today if you have not already done so. The enrollment process is free and takes just a few minutes. To enroll, please download the Peter Single 24/7 muna to your tablet or phone, or visit [email or web address] to enroll on your computer.    
And, as an [address] [place] [address] patient with a [employer] account, the results of your visits will be scanned into your electronic medical record and your primary care provider will be able to view the scanned results. We urge you to continue to see your regular Ankit Gong provider for your ongoing medical care. And while your primary care provider may not be the one available when you seek a Fetch MD virtual visit, the peace of mind you get from getting a real diagnosis real time can be priceless. For more information on Fetch MD, view our Frequently Asked Questions (FAQs) at [email or web address]. Sincerely, 
 
Chayo Jordan MD 
Chief Medical Officer [address] *:  certain medications cannot be prescribed via Fetch MD Providers Seen During Your Hospitalization Provider Specialty Primary office phone Philip Cast,  Colon and Rectal Surgery [phone] Your Primary Care Physician (PCP) Primary Care Physician Office Phone Office Fax JIMMIE BARLOW ** None ** ** None ** You are allergic to the following Allergen Reactions Benzoin Rash Becomes open and raw Lisinopril Cough Recent Documentation Height Weight Breastfeeding? BMI OB Status Smoking Status 1.702 m 115.1 kg No 39.74 kg/m2 Hysterectomy Never Smoker Emergency Contacts Name Discharge Info Relation Home Work Mobile [address] CAREGIVER [3] Friend [5]   [phone] Patient Belongings The following personal items are in your possession at time of discharge: 
  Dental Appliances: With patient  Visual Aid: Glasses      Home Medications: None   Jewelry: None  Clothing: Footwear, Pants, Shirt, Socks, Undergarments (with friend Nelia Hernandez)    Other Valuables: Eyeglasses (with Nelia Mary) Please provide this summary of care documentation to your next provider. Signatures-by signing, you are acknowledging that this After Visit Summary has been reviewed with you and you have received a copy. Patient Signature:  ____________________________________________________________ Date:  ____________________________________________________________  
  
Clementina Tallassee Provider Signature:  ____________________________________________________________ Date:  ____________________________________________________________

## 2018-08-13 NOTE — PERIOP NOTES
TRANSFER - OUT REPORT:    Verbal report given to Yu Jaimes RN (name) on Naeem Jennings  being transferred to 3 S (unit) for routine post - op       Report consisted of patients Situation, Background, Assessment and   Recommendations(SBAR). Information from the following report(s) SBAR, Kardex, ED Summary, OR Summary, Intake/Output, MAR and Cardiac Rhythm NSR was reviewed with the receiving nurse. Lines:   Peripheral IV 08/13/18 Left Hand (Active)   Site Assessment Clean, dry, & intact 8/13/2018  2:00 PM   Phlebitis Assessment 0 8/13/2018  2:00 PM   Infiltration Assessment 0 8/13/2018  2:00 PM   Dressing Status Clean, dry, & intact 8/13/2018  2:00 PM   Dressing Type Tape;Transparent 8/13/2018  2:00 PM   Hub Color/Line Status Infusing;Green 8/13/2018  2:00 PM        Opportunity for questions and clarification was provided.       Patient transported with:   O2 @ 2  liters

## 2018-08-13 NOTE — PROGRESS NOTES
Steve RN assumed pt care. Dual skin assessment and progress of care completed. Patient stable; dressing  CDI.  Opportunity for questions and clarification was provided

## 2018-08-13 NOTE — ANESTHESIA PREPROCEDURE EVALUATION
Anesthetic History   No history of anesthetic complications            Review of Systems / Medical History  Patient summary reviewed, nursing notes reviewed and pertinent labs reviewed    Pulmonary  Within defined limits                 Neuro/Psych         Psychiatric history     Cardiovascular    Hypertension              Exercise tolerance: >4 METS     GI/Hepatic/Renal     GERD           Endo/Other        Arthritis     Other Findings              Physical Exam    Airway  Mallampati: II    Neck ROM: normal range of motion   Mouth opening: Normal     Cardiovascular  Regular rate and rhythm,  S1 and S2 normal,  no murmur, click, rub, or gallop             Dental    Dentition: Full upper dentures and Lower partial plate     Pulmonary  Breath sounds clear to auscultation               Abdominal  GI exam deferred       Other Findings            Anesthetic Plan    ASA: 2  Anesthesia type: general          Induction: Intravenous  Anesthetic plan and risks discussed with: Patient

## 2018-08-13 NOTE — OP NOTES
OPERATIVE NOTE    Patient: Jong Edward MRN: [id number]  SSN: [id number]    YOB: 1955  Age: 58 y.o. Sex: female      Indications: This is a 58y.o. year-old female who presents with abdominal pain and obstruction. she  was positive for sigmoid stricture and diverticulosis. The patient was admitted for surgery as conservative measures have failed. Date of Procedure: 8/13/2018     Preoperative Diagnosis: DIVERTICULITIS WITH SIGMOID STRICTURE    Postoperative Diagnosis: DIVERTICULITIS WITH SIGMOID STRICTURE      Procedure: LAPAROSCOPIC LOW ANTERIOR RESECTION, MOBILIZATION OF SPLENIC FLEXURE, AND VENTRAL HERNIA REPAIR, BILATERAL TAP BLOCK, ABLATION OF PERITONEAL ENDOMETRIOSIS VENTRAL HERNIA REPAIR,  Surgeon(s): Surgeon(s) and Role:     * DO Brandie Foote Primary    Anesthesia: General     Procedure: After obtaining informed consent and properly identifying the patient and laterality the patient was brought to the operating room and placed in the operating table in the supine position. General endotracheal anesthesia was administered. The patient was then placed in the low lithotomy position and all pressure points were padded. The abdomen and perineum was prepped and draped in the standard surgical fashion. A infraumbilical vertical incision was made and the abdomen was entered using a Perry open technique. The abdomen was insufflated and a camera was placed into the abdomen. Upon visual inspection the area of concern was identified in the left lower quadrant. Under direct visualization an exparel TAP block was performed bilaterally. Then 4 auxiliary trocars were placed in the right lower quadrant, right upper quadrant, left lower quadrant and left upper quadrant positions. There was also a fat containing ventral hernia which had been symptomatic to the patient and requested repair in the pre-op area. It was also seen on CT scan.   The hernia sac was dissected out using cautery and the hernia sac and contents were sent to pathology. Instruments were then placed and the abdomen the sigmoid was grasped and lifted. It was retracted medially and the white line of toldt was incised following it's path superiorly. Mobilization of the splenic flexure was required to allow for length of the left colon for anastomosis. This was performed using a combination of electrocautery and ligasure energy device. Thereafter, mobilization of the sigmoid was performed. A hand port was placed at the umbilicus. The proximal transection point was then identified an area of soft normal appearing left colon. A laparoscopic stapler was inserted through a trochar in the RLQ which was upsized to 12mm and the proximal sigmoid was stapled. A distal transection point was identified on the proximal rectum. A laparoscopic stapler with a green load was inserted through the hand port. and the proximal rectum was stapled. The mesentery was taken in a medial to lateral fashion special attention was taken to identify and avoid the left ureter before transection of the vessels with the ligasure. There were several peritoneal nodules which were removed with the specimen. Endometriosis of the pelvic/peritoneal wall was identified. The patient had complained of chronic intractable lower abdominal pain so it seemed prudent to perform the ablation of endometriosis with cautery to treat her symptoms with the risk of a second surgery. The handport accessory was removed and the specimen brought out through the wound through the [facility] wound protector. The specimen was then handed off the table. The staple line was squared off with blue towels. A 2-0 Prolene was utilized to create a purse-string and an anvil was placed into the proximal colon. The abdomen was then re-insufflated. Under direct visualization the 29 EEA stapler was placed through the anus into the rectal stump.  Via palpation and visualization the spike was passed through the rectal wall just anterior to the staple line. An EEA stapled anastomosis was then performed without difficulty. Two good donuts obtained, intact. Proctoscopy was performed and using proctoscopy under direct visualization, a leak test was performed with insufflation of the rectum under saline. There was no evidence of leak of the anastomosis. The area was irrigated and irrigant removed via suction and hemostasis was assured. The length of the left colon was once again identified to ensure no torsion of the colon or small bowel trapped posteriorly behind the colon. A drain was placed in the pelvis exiting through the LLQ trocar site. The trocars and hand port were removed under direct visualization  The wounds were irrigated and the handport fascia was closed a running #1 PDS suture. Gloves and instruments were changed and the wounds irrigated. The dermis of the midline wound was reapproximated using 3-0 stratafix. The skin of the trocar incisions was closed using a monocryl subcuticular stitch. All incisions were then dressed with prineo. The patient was awakened from anesthesia and extubated without complication and transported to the PACU for further observation.  Sponge/ Needle/ Instrument count reported as correct      Findings: evidence of diverticulitis, peritoneal nodule, endometriosis of pelvis    Estimated Blood Loss: Minimal    Specimens:   ID Type Source Tests Collected by Time Destination   1 : umbilical hernia and sac Preservative Abdomen  Johnny Saucedo, DO 8/13/2018 0932 Pathology   2 : peritoneal nodule Preservative Peritoneum  Johnny Saucedo, DO 8/13/2018 1021 Pathology   3 : sigmoid Preservative Sigmoid  Johnny Saucedo, DO 8/13/2018 1101 Pathology   4 : proximal and distal donuts Preservative Colon  Johnny Saucedo, DO 8/13/2018 1215 Pathology        Drains: (19f)Davey-Whalen drain(s) with closed bulb suction in the pelvis    Implants: * No implants in log *    Complications: None; patient tolerated the procedure addis.    Laxmi Yadav DO  8/13/2018  7:07 PM

## 2018-08-13 NOTE — IP AVS SNAPSHOT
Summary of Care Report The Summary of Care report has been created to help improve care coordination. Users with access to [employer] or [address] (Web-based application) may access additional patient information including the Discharge Summary. If you are not currently a [address] user and need more information, please call the number listed below in the [place] [address] section and ask to be connected with Medical Records. Facility Information Name Address Phone [address] [address] [phone] Patient Information Patient Name Sex CHAS Odell ([id number]) Female 1955 Discharge Information Admitting Provider Service Area Unit Jenifer Rodriguez DO / [address] Surg/Onco / [phone] Discharge Provider Discharge Date/Time Discharge Disposition Destination (none) 8/15/2018 (Pending) AHR (none) Patient Language Language ENGLISH [13] Hospital Problems as of 8/15/2018  Reviewed: 2018  7:51 AM by Jenifer Rodriguez,  Class Noted - Resolved Last Modified POA Active Problems * (Principal)Stricture of sigmoid colon ([facility]  2018 - Present 2018 by Jenifer Rodriguez, DO Yes Entered by Jenifer Rodriguez, DO  
  GERD (gastroesophageal reflux disease)  2018 - Present 2018 by Jenifer Rodriguez, DO Unknown Entered by Jenifer Rodriguez, DO Essential hypertension  2018 - Present 2018 by Jenifer Rodriguez, DO Unknown Entered by Jenifer Rodriguez, DO Hyperlipidemia  2018 - Present 2018 by Jenifer Rodriguez, DO Unknown Entered by Jenifer Rodriguez, DO History of gastric restrictive surgery  2018 - Present 2018 by Jenifer Rodriguez, DO Unknown Entered by Jenifer Rodriguez, DO Fibromyalgia  2018 - Present 2018 by Jenifer Rodriguez, DO Unknown Entered by Soto Chapman DO Arthritis  8/13/2018 - Present 8/13/2018 by Soto Chapman DO Unknown Entered by Soto Chapman DO Overview Signed 8/13/2018  3:44 PM by Soto Chapman DO  
   spondylosis Non-Hospital Problems as of 8/15/2018  Reviewed: 8/13/2018  7:51 AM by Soto Chapman DO None You are allergic to the following Allergen Reactions Benzoin Rash Becomes open and raw Lisinopril Cough Current Discharge Medication List  
  
START taking these medications Dose & Instructions Dispensing Information Comments  
 gabapentin 100 mg capsule Commonly known as:  NEURONTIN Dose:  200 mg Take 2 Caps by mouth three (3) times daily. Indications: Postoperative Acute Pain Quantity:  60 Cap Refills:  1  
   
 ibuprofen 800 mg tablet Commonly known as:  MOTRIN Dose:  800 mg Take 1 Tab by mouth every eight (8) hours as needed for Pain. Quantity:  30 Tab Refills:  2 CONTINUE these medications which have NOT CHANGED Dose & Instructions Dispensing Information Comments ALEVE 220 mg tablet Generic drug:  naproxen sodium Dose:  220 mg Take 220 mg by mouth two (2) times daily as needed. Refills:  0 DULoxetine 60 mg capsule Commonly known as:  CYMBALTA Dose:  60 mg Take 60 mg by mouth daily. Indications: FIBROMYALGIA Refills:  0 NexIUM 40 mg capsule Generic drug:  esomeprazole Dose:  40 mg Take 40 mg by mouth daily. Refills:  0  
   
 olmesartan-hydroCHLOROthiazide 40-12.5 mg per tablet Commonly known as:  BENICAR HCT Dose:  1 Tab Take 1 Tab by mouth daily. Refills:  0  
   
 * Senna 8.6 mg tablet Generic drug:  senna Dose:  5 Tab Take 5 Tabs by mouth daily. Refills:  0  
   
 * Ex-Lax 15 mg tablet Generic drug:  sennosides Dose:  1-3 Tab Take 1-3 Tabs by mouth daily as needed for Constipation. Refills:  0  
   
 sertraline 25 mg tablet Commonly known as:  ZOLOFT Dose:  25 mg Take 25 mg by mouth daily. Refills:  0  
   
 simvastatin 10 mg tablet Commonly known as:  ZOCOR Take  by mouth daily. Indications: HYPERCHOLESTEROLEMIA Refills:  0  
   
 TYLENOL EXTRA STRENGTH 500 mg tablet Generic drug:  acetaminophen Dose:  1000 mg Take 1,000 mg by mouth every six (6) hours as needed for Pain. Refills:  0 ZyrTEC 10 mg tablet Generic drug:  cetirizine Dose:  10 mg Take 10 mg by mouth daily. Refills:  0  
   
 * Notice: This list has 2 medication(s) that are the same as other medications prescribed for you. Read the directions carefully, and ask your doctor or other care provider to review them with you. Surgery Information ID Date/Time Status Primary Surgeon All Procedures Location [id number] 8/13/2018 0800 Posted Dottie Olszewski, DO LAPAROSCOPIC LOW ANTERIOR RESECTION, MOBILIZATION OF SPLENIC FLEXURE, AND VENTRAL HERNIA REPAIR [facility] OR Follow-up Information Follow up With Details Comments Contact Info Dottie Olszewski, DO Schedule an appointment as soon as possible for a visit For wound re-check in 2 weeks [address] [place] [address] [address] [place] [address] 
[phone] Ariel Velasquez MD     
  
 
Discharge Instructions Diverticulitis: Care Instructions Your Care Instructions Diverticulitis occurs when pouches form in the wall of the colon and become inflamed or infected. It can be very painful. Doctors aren't sure what causes diverticulitis. There is no proof that foods such as nuts, seeds, or berries cause it or make it worse. A low-fiber diet may cause the colon to work harder to push stool forward. Pouches may form because of this extra work. It may be hard to think about healthy eating while you're in pain. But as you recover, you might think about how you can use healthy eating for overall better health. Healthy eating may help you avoid future attacks. Follow-up care is a key part of your treatment and safety. Be sure to make and go to all appointments, and call your doctor if you are having problems. It's also a good idea to know your test results and keep a list of the medicines you take. How can you care for yourself at home? · Drink plenty of fluids, enough so that your urine is light yellow or clear like water. If you have kidney, heart, or liver disease and have to limit fluids, talk with your doctor before you increase the amount of fluids you drink. · Stick to liquids or a bland diet (plain rice, bananas, dry toast or crackers, applesauce) until you are feeling better. Then you can return to regular foods and gradually increase the amount of fiber in your diet. · Use a heating pad set on low on your belly to relieve mild cramps and pain. · Get extra rest until you are feeling better. · Be safe with medicines. Read and follow all instructions on the label. ¨ If the doctor gave you a prescription medicine for pain, take it as prescribed. ¨ If you are not taking a prescription pain medicine, ask your doctor if you can take an over-the-counter medicine. · If your doctor prescribed antibiotics, take them as directed. Do not stop taking them just because you feel better. You need to take the full course of antibiotics. To prevent future attacks of diverticulitis · Avoid constipation: 
¨ Include fruits, vegetables, beans, and whole grains in your diet each day. These foods are high in fiber. ¨ Drink plenty of fluids, enough so that your urine is light yellow or clear like water. If you have kidney, heart, or liver disease and have to limit fluids, talk with your doctor before you increase the amount of fluids you drink. ¨ Get some exercise every day. Build up slowly to 30 to 60 minutes a day on 5 or more days of the week. ¨ Take a fiber supplement, such as Citrucel or Metamucil, every day if needed. Read and follow all instructions on the label. ¨ Schedule time each day for a bowel movement. Having a daily routine may help. Take your time and do not strain when having a bowel movement. When should you call for help? Call your doctor now or seek immediate medical care if: 
  · You have a fever.  
  · You are vomiting.  
  · You have new or worse belly pain.  
  · You cannot pass stools or gas.  
 Watch closely for changes in your health, and be sure to contact your doctor if you have any problems. Where can you learn more? Go to http://kasey-ignacio.info/. Enter H901 in the search box to learn more about \"Diverticulitis: Care Instructions. \" Current as of: May 12, 2017 Content Version: 11.7 © 2710-6637 [employer]. Care instructions adapted under license by [employer] (which disclaims liability or warranty for this information). If you have questions about a medical condition or this instruction, always ask your healthcare professional. [place] [address] any warranty or liability for your use of this information. Post-Operative Discharge Instructions Yomaira Pulido DO, FASCRS, [facility] [address] [place] [address] [facility] 
[phone] Patient: Octavio Fritz MRN: [id number]  CSN: [id number] YOB: 1955  Age: 58 y.o. Sex: female DOA: 8/13/2018 LOS:  LOS: 2 days   Discharge Date:   
 
Acute Diagnoses: DIVERTICULITIS WITH SIGMOID STRICTURE Stricture of sigmoid colon ([facility] Chronic Medical Diagnoses: 
Problem List as of 8/15/2018  Date Reviewed: 8/13/2018 Codes Class Noted - Resolved * (Principal)Strict[facility] ([facility] ICD-10-CM: B01.674 ICD-9-CM: 560.9  8/13/2018 - Present GERD (gastroesophageal reflux disease) ICD-10-CM: K21.9 ICD-9-CM: 530.81  8/13/2018 - Present Essential hypertension ICD-10-CM: I10 
ICD-9-CM: 401.9  8/13/2018 - Present Hyperlipidemia ICD-10-CM: E78.5 ICD-9-CM: 272.4  2018 - Present History of gastric restrictive surgery ICD-10-CM: Z98.84 ICD-9-CM: V45.86  2018 - Present Fibromyalgia ICD-10-CM: M79.7 ICD-9-CM: 729.1  2018 - Present Arthritis ICD-10-CM: M19.90 ICD-9-CM: 716.90  2018 - Present Overview Signed 2018  3:44 PM by Cortez Rascon DO  
  spondylosis Diet 1. Resume prior to surgery diet as tolerated. Activity 1. Do not drive a car or operate any hazardous machinery the day of surgery. 2. Rest quietly today. 3. No bending or heavy lifting. 4. You may resume other prior to surgery activities as tolerated. 5. You may remove the bandage and shower in 1 day. Drain / Wound Care 1. Follow all drain / wound care instructions exactly as explained by the Nurse at time of discharge. 2. Apply an ice pack to the surgical site for 48 hours. 3. Do not put any salves or ointments on the wound. Allow it to form a dry scab. 4. Leave steri-strips / Dermabond alone. They should be allowed to fall off on their own in 7-14 days. Medications 1. It is important to take your medications exactly as they are prescribed. 2. Keep your medication in the bottles provided by the pharmacist, and keep a list of the medication names, dosages, and times they should be taken in your wallet. Call 911 anytime you think you may need emergency care. For example, call if: 
· You passed out (lost consciousness). · You have severe trouble breathing. · You have sudden chest pain and shortness of breath. Notify your Surgeon for any of the followin. Fever, chills, nausea, vomiting, severe abdominal pain or bleeding. 2. If you experience any redness or discharge or sign of infection. 3. Persistent nausea lasting more than 24 hours. If you are unable to reach your Surgeon for any of the symptoms above, you should proceed directly to the nearest Emergency Department. Post-Operative Appointment Information Call Dr. Lashawn Clinton office tomorrow morning at [phone] to schedule a post-operative office visit in two (2) weeks. If any questions or concerns arise, call your Surgeon at [phone]. Chart Review Routing History Recipient Method Report Sent By Lisa Albarado MD  
Fax: [phone] Phone: [phone] Fax IP Auto Routed Francesca Pyle MD [[id number]] 8/25/2016  8:10 AM 08/25/2016

## 2018-08-14 LAB
ANION GAP SERPL CALC-SCNC: 6 MMOL/L (ref 3–18)
BUN SERPL-MCNC: 10 MG/DL (ref 7–18)
BUN/CREAT SERPL: 10 (ref 12–20)
CALCIUM SERPL-MCNC: 8.3 MG/DL (ref 8.5–10.1)
CHLORIDE SERPL-SCNC: 105 MMOL/L (ref 100–108)
CO2 SERPL-SCNC: 29 MMOL/L (ref 21–32)
CREAT SERPL-MCNC: 1.03 MG/DL (ref 0.6–1.3)
ERYTHROCYTE [DISTWIDTH] IN BLOOD BY AUTOMATED COUNT: 15.7 % (ref 11.6–14.5)
GLUCOSE SERPL-MCNC: 106 MG/DL (ref 74–99)
HCT VFR BLD AUTO: 29.8 % (ref 35–45)
HGB BLD-MCNC: 9.3 G/DL (ref 12–16)
MAGNESIUM SERPL-MCNC: 1.7 MG/DL (ref 1.6–2.6)
MCH RBC QN AUTO: 25.7 PG (ref 24–34)
MCHC RBC AUTO-ENTMCNC: 31.2 G/DL (ref 31–37)
MCV RBC AUTO: 82.3 FL (ref 74–97)
PHOSPHATE SERPL-MCNC: 2.9 MG/DL (ref 2.5–4.9)
PLATELET # BLD AUTO: 219 K/UL (ref 135–420)
PMV BLD AUTO: 10.4 FL (ref 9.2–11.8)
POTASSIUM SERPL-SCNC: 3.2 MMOL/L (ref 3.5–5.5)
RBC # BLD AUTO: 3.62 M/UL (ref 4.2–5.3)
SODIUM SERPL-SCNC: 140 MMOL/L (ref 136–145)
WBC # BLD AUTO: 5.9 K/UL (ref 4.6–13.2)

## 2018-08-14 PROCEDURE — 74011000250 HC RX REV CODE- 250: Performed by: SURGERY

## 2018-08-14 PROCEDURE — 36415 COLL VENOUS BLD VENIPUNCTURE: CPT | Performed by: SURGERY

## 2018-08-14 PROCEDURE — 85027 COMPLETE CBC AUTOMATED: CPT | Performed by: SURGERY

## 2018-08-14 PROCEDURE — 65270000029 HC RM PRIVATE

## 2018-08-14 PROCEDURE — 74011250636 HC RX REV CODE- 250/636: Performed by: SURGERY

## 2018-08-14 PROCEDURE — 84100 ASSAY OF PHOSPHORUS: CPT | Performed by: SURGERY

## 2018-08-14 PROCEDURE — 83735 ASSAY OF MAGNESIUM: CPT | Performed by: SURGERY

## 2018-08-14 PROCEDURE — 74011250637 HC RX REV CODE- 250/637: Performed by: SURGERY

## 2018-08-14 PROCEDURE — 80048 BASIC METABOLIC PNL TOTAL CA: CPT | Performed by: SURGERY

## 2018-08-14 PROCEDURE — 74011000258 HC RX REV CODE- 258: Performed by: SURGERY

## 2018-08-14 RX ORDER — DULOXETIN HYDROCHLORIDE 60 MG/1
60 CAPSULE, DELAYED RELEASE ORAL DAILY
Status: DISCONTINUED | OUTPATIENT
Start: 2018-08-15 | End: 2018-08-16 | Stop reason: HOSPADM

## 2018-08-14 RX ORDER — PANTOPRAZOLE SODIUM 40 MG/1
40 TABLET, DELAYED RELEASE ORAL DAILY
Status: DISCONTINUED | OUTPATIENT
Start: 2018-08-15 | End: 2018-08-16 | Stop reason: HOSPADM

## 2018-08-14 RX ORDER — SERTRALINE HYDROCHLORIDE 50 MG/1
25 TABLET, FILM COATED ORAL DAILY
Status: DISCONTINUED | OUTPATIENT
Start: 2018-08-15 | End: 2018-08-16 | Stop reason: HOSPADM

## 2018-08-14 RX ORDER — OLMESARTAN MEDOXOMIL AND HYDROCHLOROTHIAZIDE 40/12.5 40; 12.5 MG/1; MG/1
1 TABLET ORAL DAILY
Status: DISCONTINUED | OUTPATIENT
Start: 2018-08-15 | End: 2018-08-14 | Stop reason: CLARIF

## 2018-08-14 RX ORDER — SIMVASTATIN 10 MG/1
10 TABLET, FILM COATED ORAL DAILY
Status: DISCONTINUED | OUTPATIENT
Start: 2018-08-15 | End: 2018-08-16 | Stop reason: HOSPADM

## 2018-08-14 RX ORDER — MAGNESIUM SULFATE HEPTAHYDRATE 40 MG/ML
2 INJECTION, SOLUTION INTRAVENOUS ONCE
Status: COMPLETED | OUTPATIENT
Start: 2018-08-14 | End: 2018-08-14

## 2018-08-14 RX ORDER — OLMESARTAN MEDOXOMIL 20 MG/1
40 TABLET ORAL DAILY
Status: DISCONTINUED | OUTPATIENT
Start: 2018-08-15 | End: 2018-08-16 | Stop reason: HOSPADM

## 2018-08-14 RX ORDER — HYDROCHLOROTHIAZIDE 25 MG/1
12.5 TABLET ORAL DAILY
Status: DISCONTINUED | OUTPATIENT
Start: 2018-08-15 | End: 2018-08-16 | Stop reason: HOSPADM

## 2018-08-14 RX ADMIN — KETOROLAC TROMETHAMINE 30 MG: 30 INJECTION, SOLUTION INTRAMUSCULAR at 08:11

## 2018-08-14 RX ADMIN — POTASSIUM PHOSPHATE, MONOBASIC AND POTASSIUM PHOSPHATE, DIBASIC: 224; 236 INJECTION, SOLUTION INTRAVENOUS at 21:17

## 2018-08-14 RX ADMIN — Medication 10 ML: at 05:35

## 2018-08-14 RX ADMIN — KETOROLAC TROMETHAMINE 30 MG: 30 INJECTION, SOLUTION INTRAMUSCULAR at 12:12

## 2018-08-14 RX ADMIN — GABAPENTIN 200 MG: 100 CAPSULE ORAL at 08:11

## 2018-08-14 RX ADMIN — MAGNESIUM SULFATE HEPTAHYDRATE 2 G: 40 INJECTION, SOLUTION INTRAVENOUS at 18:59

## 2018-08-14 RX ADMIN — ACETAMINOPHEN 1000 MG: 10 INJECTION, SOLUTION INTRAVENOUS at 08:11

## 2018-08-14 RX ADMIN — HEPARIN SODIUM 5000 UNITS: 5000 INJECTION INTRAVENOUS; SUBCUTANEOUS at 17:36

## 2018-08-14 RX ADMIN — DEXTROSE MONOHYDRATE AND SODIUM CHLORIDE 125 ML/HR: 5; .45 INJECTION, SOLUTION INTRAVENOUS at 12:14

## 2018-08-14 RX ADMIN — KETOROLAC TROMETHAMINE 30 MG: 30 INJECTION, SOLUTION INTRAMUSCULAR at 18:20

## 2018-08-14 RX ADMIN — HEPARIN SODIUM 5000 UNITS: 5000 INJECTION INTRAVENOUS; SUBCUTANEOUS at 08:11

## 2018-08-14 RX ADMIN — GABAPENTIN 200 MG: 100 CAPSULE ORAL at 21:17

## 2018-08-14 RX ADMIN — ACETAMINOPHEN 1000 MG: 10 INJECTION, SOLUTION INTRAVENOUS at 17:36

## 2018-08-14 RX ADMIN — Medication 10 ML: at 21:18

## 2018-08-14 RX ADMIN — GABAPENTIN 200 MG: 100 CAPSULE ORAL at 17:36

## 2018-08-14 NOTE — PROGRESS NOTES
2002 - Assumed care of pt at this time. Pt in bed with no signs of distress. Pt left with call light within reach and encouraged to call for assistance. 2043 - Head to toe assessment performed at this time. Pt denies SOB or chest pain. Pt denies numbness or tingling to extremities. Pt educated on pain control and medications. Pt encouraged to manage pain and to call for assistance. Will continue to monitor. 2250 - Pt stated that pain was a 5/10 and requested pain medication. 4015 - Drummond removed at this time. Patient tolerated well and educated that she must urinate on her own without the catheter. Shift summary - Pt pain managed with prn medication per MAR. Pt informed about all medications and side effects and encouraged to ask questions about medication. Pt left with no signs of distress and any concerns of pt addressed.

## 2018-08-14 NOTE — PROGRESS NOTES
Shift summary: Pt has been stable since admission. No major concerns noted. Pt aware and utilizing IS 10xqh. Surgical site CDI. ROCIO patent. Bedside shift change report given to HERACLIO Chau RN. Report included the following information SBAR, Kardex, Intake/Output, MAR and Recent Results.   Patient Vitals for the past 8 hrs:   Temp Pulse Resp BP SpO2   08/13/18 1954 98 °F (36.7 °C) 72 16 129/78 99 %   08/13/18 1800 98 °F (36.7 °C) 72 16 124/65 98 %   08/13/18 1700 98 °F (36.7 °C) 76 16 120/86 100 %   08/13/18 1600 98 °F (36.7 °C) 72 16 118/70 98 %   08/13/18 1500 97.4 °F (36.3 °C) 77 15 116/71 98 %   08/13/18 1440 - 72 13 127/64 97 %   08/13/18 1435 - 79 13 124/68 96 %   08/13/18 1430 - 71 14 125/63 96 %   08/13/18 1425 - 70 14 123/64 96 %   08/13/18 1420 - 71 14 122/58 97 %   08/13/18 1415 - 72 14 118/50 96 %   08/13/18 1410 - 76 10 122/61 96 %   08/13/18 1405 - 71 14 119/62 95 %   08/13/18 1400 98 °F (36.7 °C) 79 15 117/60 97 %   08/13/18 1355 - 78 18 123/62 99 %   08/13/18 1354 - 77 14 - 100 %   08/13/18 1350 - 70 13 115/55 100 %   08/13/18 1345 - 75 16 120/60 100 %   08/13/18 1340 - 74 14 121/63 100 %   08/13/18 1337 98.4 °F (36.9 °C) 72 13 125/69 100 %

## 2018-08-14 NOTE — PROGRESS NOTES
Transition of Care (ANDREA) Plan:        Pt admitted for an elective surgical procedure. Pt is independent. Please encourage ambulation. Pt lives alone and her friend will assist as needed. Pt indicated she does have access to a cane if needed. No transition of care needs identified. Anticipate pt will be medically stable for discharge within the next 24-48 hours. CM available to assist as needed. ANDREA Transportation:   How is patient being transported at discharge? Family/Friend      When? Once cleared by physician     Is transport scheduled? N/A      Follow-up appointment and transportation:   PCP/Specialist?  See AVS for Appointment         Who is transporting to the follow-up appointment? Self/Family/Friend      Is transport for follow up appointment scheduled? N/A    Communication plan (with patient/family): Who is being called? Patient or Next of Kin? Responsible party? Patient      What number(s) is to be used? See Facesheet      What service provider is calling for [facility] services? When are they calling? Readmission Risk? (Green/Low; Yellow/Moderate; Red/High):  Green      Care Management Interventions  PCP Verified by CM: Yes  Mode of Transport at Discharge:  Other (see comment) (Friend)  Transition of Care Consult (CM Consult): Discharge Planning  Health Maintenance Reviewed: Yes  Current Support Network: Family Lives Nearby  Confirm Follow Up Transport: Self  Plan discussed with Pt/Family/Caregiver: Yes  Discharge Location  Discharge Placement: Home with family assistance

## 2018-08-14 NOTE — PROGRESS NOTES
Bedside and Verbal shift change report given to CHAS Gonzales RN (oncoming nurse) by Randy Olea RN   (offgoing nurse). Report included the following information SBAR, Kardex, Intake/Output, MAR and Recent Results.

## 2018-08-14 NOTE — PROGRESS NOTES
Problem: Falls - Risk of  Goal: *Absence of Falls  Document Kelil Fall Risk and appropriate interventions in the flowsheet.    Outcome: Progressing Towards Goal  Fall Risk Interventions:  Mobility Interventions: Patient to call before getting OOB         Medication Interventions: Teach patient to arise slowly, Patient to call before getting OOB

## 2018-08-14 NOTE — PROGRESS NOTES
INITIAL NUTRITION ASSESSMENT     RECOMMENDATIONS/PLAN:   Adv diet per MD  Avoid prolonged Clear Liquid diet as it does not meet estimated needs  Monitor labs/lytes, diet adv, skin integrity, wt, fluid status, BM, PO intakes     REASON FOR ASSESSMENT:     []  RN Referral:    [x] MST score >/=2  Malnutrition Screening Tool (MST):  Recently Lost Weight Without Trying: No  If Yes, How Much Weight Loss: Unsure  Eating Poorly Due to Decreased Appetite: No  MST Score: 2      NUTRITION ASSESSMENT:   Client History: 58 yrs old Female admitted with sigmoid stricture and diverticulosis surgery. PMHx:  [x] Reviewed   Cultural/Druze Food Preferences: None Identified    FOOD/NUTRITION HISTORY  Diet History: liquid diet since Saturday. Pt has been trying to eat more protein and watch carbohydrates. Food Allergies:  [x] NKFA     Pertinent PTA Medications: senna, ex-lax,      NUTRITION INTAKE   Diet Order:  Clear Liquid       Average PO Intake:       No data found. Pertinent Medications:  [x] Reviewed; dextrose 5%,   Electrolyte Replacement Protocol: []K  []Mg  []PO4    Insulin:  [] SSI  [] Pre-meal   []  Basal   [] Drip  [] None  Pt expected to meet estimated nutrient needs through next review:          []  Yes     [x] No; clear liquid do not meet estimated needs ANTHROPOMETRICS  Height: 5' 7\" (170.2 cm)       Weight: 103.6 kg (228 lb 5 oz)    BMI: 35.8 kg/m^2  -  obese (30%-39.9% BMI)        Weight change: no wt loss noted in chart hx                                   Comparison to Reference Standards:  IBW: 135 lbs      %IBW: 169%      AdjBW: 71.9 kg    NUTRITION-FOCUSED PHYSICAL ASSESSMENT  Skin: No PU. GI: No BM    BIOCHEMICAL DATA & MEDICAL TESTS  Pertinent Labs:  [x] Reviewed;      NUTRITION PRESCRIPTION  Calories: 6057-2901 kcal/day based on 25-30kcal/kg of IBW  Protein: 73-92 g/day based on 1.2-1.5 g/kg of IBW  Fluid: 1911-3362 ml/day based on 1 kcal/ml      NUTRITION DIAGNOSES:   1. At risk for inadequate oral intake related to current diet order as evidence by clear liquid diet     NUTRITION INTERVENTIONS:   INTERVENTIONS:        GOALS:  1. Other:Adv diet per MD 1. Adv diet per MD by next review 5 days     LEARNING NEEDS (Diet, Supplementation, Food/Nutrient-Drug Interaction):   [x] None Identified  [] Inpatient education provided/documented    [] Identified and patient:  [] Declined     [] Was not appropriate/indicated  NUTRITION MONITORING /EVALUATION:   Follow PO intake  Monitor wt  Monitor renal labs, electrolytes, fluid status  Monitor for additional supplement needs    [] Participated in Interdisciplinary Rounds  [x] Interdisciplinary Care Plan Reviewed/Documented  DISCHARGE NUTRITION RECOMMENDATIONS ADDRESSED:      [x] To be determined closer to discharge    NUTRITION RISK:     [x]  At risk                     []  Not currently at risk     Will follow-up per policy.   Deandre Simon, [id number] [address]

## 2018-08-14 NOTE — ROUTINE PROCESS
Bedside and Verbal shift change report given to RENÉ Haley RN (oncoming nurse) by MAGGIE Giles RN (offgoing nurse). Report included the following information SBAR, Kardex, Intake/Output, MAR and Recent Results.

## 2018-08-14 NOTE — PROGRESS NOTES
Problem: Falls - Risk of  Goal: *Absence of Falls  Document Kelli Fall Risk and appropriate interventions in the flowsheet.    Outcome: Progressing Towards Goal  Fall Risk Interventions:  Mobility Interventions: Patient to call before getting OOB         Medication Interventions: Patient to call before getting OOB, Teach patient to arise slowly

## 2018-08-14 NOTE — PROGRESS NOTES
Surgery Post Operative Day Note    Naeem Jennings    Admit Date: 8/13/2018  Surgery Date:  [unfilled]  POD: 1 Day Post-Op    Subjective:      Patient present conditions: No significant medical complaints    Objective:     Patient Vitals for the past 24 hrs:   BP Temp Pulse Resp SpO2   08/14/18 1614 132/83 98.4 °F (36.9 °C) 89 16 98 %   08/14/18 1108 147/71 98.4 °F (36.9 °C) 66 16 96 %   08/14/18 0734 105/64 99 °F (37.2 °C) 72 15 96 %   08/14/18 0418 137/72 98.8 °F (37.1 °C) 71 16 94 %   08/13/18 2356 126/70 98.3 °F (36.8 °C) 72 16 92 %   08/13/18 1954 129/78 98 °F (36.7 °C) 72 16 99 %      08/12 1901 - 08/14 0700  In: 8733 [P.O.:590; I.V.:2600]  Out: 7966 [Urine:1000; Drains:160]  Recent Results (from the past 24 hour(s))   CBC W/O DIFF    Collection Time: 08/14/18  5:04 AM   Result Value Ref Range    WBC 5.9 4.6 - 13.2 K/uL    RBC 3.62 (L) 4.20 - 5.30 M/uL    HGB 9.3 (L) 12.0 - 16.0 g/dL    HCT 29.8 (L) 35.0 - 45.0 %    MCV 82.3 74.0 - 97.0 FL    MCH 25.7 24.0 - 34.0 PG    MCHC 31.2 31.0 - 37.0 g/dL    RDW 15.7 (H) 11.6 - 14.5 %    PLATELET 254 850 - 269 K/uL    MPV 10.4 9.2 - 33.6 FL   METABOLIC PANEL, BASIC    Collection Time: 08/14/18  5:04 AM   Result Value Ref Range    Sodium 140 136 - 145 mmol/L    Potassium 3.2 (L) 3.5 - 5.5 mmol/L    Chloride 105 100 - 108 mmol/L    CO2 29 21 - 32 mmol/L    Anion gap 6 3.0 - 18 mmol/L    Glucose 106 (H) 74 - 99 mg/dL    BUN 10 7.0 - 18 MG/DL    Creatinine 1.03 0.6 - 1.3 MG/DL    BUN/Creatinine ratio 10 (L) 12 - 20      GFR est AA >60 >60 ml/min/1.73m2    GFR est non-AA 54 (L) >60 ml/min/1.73m2    Calcium 8.3 (L) 8.5 - 10.1 MG/DL   MAGNESIUM    Collection Time: 08/14/18  5:04 AM   Result Value Ref Range    Magnesium 1.7 1.6 - 2.6 mg/dL   PHOSPHORUS    Collection Time: 08/14/18  5:04 AM   Result Value Ref Range    Phosphorus 2.9 2.5 - 4.9 MG/DL       Abdomen:             Abdominal Assessment: Soft  Wound:     Status:                   Peripheral IV 08/13/18 Left Hand-Dressing Status: Clean, dry, & intact  Davey-Whalen Drain 08/13/18 Left Abdomen-Dressing Status: Clean, dry, & intact    Dressing Changes:Peripheral IV 08/13/18 Left Hand-Dressing Status: Clean, dry, & intact  Davey-Whalen Drain 08/13/18 Left Abdomen-Dressing Status: Clean, dry, & intact    Pain Level:                Nausea and Vomiting: minus    Stool: minus            Assessment:     Progress: good     Plan:     Diet: start clear liquids  Plan for continued care: continue RX as is, mobilize and add PTA, replace electrolytes, DC IVF, protein shakes, IS, DVT proph,       Signed By: Paige Raza DO

## 2018-08-15 VITALS
DIASTOLIC BLOOD PRESSURE: 73 MMHG | OXYGEN SATURATION: 98 % | WEIGHT: 253.7 LBS | HEIGHT: 67 IN | SYSTOLIC BLOOD PRESSURE: 138 MMHG | RESPIRATION RATE: 16 BRPM | TEMPERATURE: 99 F | HEART RATE: 77 BPM | BODY MASS INDEX: 39.82 KG/M2

## 2018-08-15 LAB
ANION GAP SERPL CALC-SCNC: 2 MMOL/L (ref 3–18)
BUN SERPL-MCNC: 5 MG/DL (ref 7–18)
BUN/CREAT SERPL: 5 (ref 12–20)
CALCIUM SERPL-MCNC: 8.7 MG/DL (ref 8.5–10.1)
CHLORIDE SERPL-SCNC: 110 MMOL/L (ref 100–108)
CO2 SERPL-SCNC: 30 MMOL/L (ref 21–32)
CREAT SERPL-MCNC: 1.05 MG/DL (ref 0.6–1.3)
ERYTHROCYTE [DISTWIDTH] IN BLOOD BY AUTOMATED COUNT: 15.4 % (ref 11.6–14.5)
GLUCOSE SERPL-MCNC: 95 MG/DL (ref 74–99)
HCT VFR BLD AUTO: 30 % (ref 35–45)
HGB BLD-MCNC: 9.2 G/DL (ref 12–16)
MAGNESIUM SERPL-MCNC: 2.3 MG/DL (ref 1.6–2.6)
MCH RBC QN AUTO: 25.1 PG (ref 24–34)
MCHC RBC AUTO-ENTMCNC: 30.7 G/DL (ref 31–37)
MCV RBC AUTO: 82 FL (ref 74–97)
PHOSPHATE SERPL-MCNC: 3.7 MG/DL (ref 2.5–4.9)
PLATELET # BLD AUTO: 216 K/UL (ref 135–420)
PMV BLD AUTO: 10 FL (ref 9.2–11.8)
POTASSIUM SERPL-SCNC: 4.2 MMOL/L (ref 3.5–5.5)
RBC # BLD AUTO: 3.66 M/UL (ref 4.2–5.3)
SODIUM SERPL-SCNC: 142 MMOL/L (ref 136–145)
WBC # BLD AUTO: 4.8 K/UL (ref 4.6–13.2)

## 2018-08-15 PROCEDURE — 36415 COLL VENOUS BLD VENIPUNCTURE: CPT | Performed by: SURGERY

## 2018-08-15 PROCEDURE — 84100 ASSAY OF PHOSPHORUS: CPT | Performed by: SURGERY

## 2018-08-15 PROCEDURE — 65270000029 HC RM PRIVATE

## 2018-08-15 PROCEDURE — 74011250636 HC RX REV CODE- 250/636: Performed by: SURGERY

## 2018-08-15 PROCEDURE — 74011250637 HC RX REV CODE- 250/637: Performed by: SURGERY

## 2018-08-15 PROCEDURE — 83735 ASSAY OF MAGNESIUM: CPT | Performed by: SURGERY

## 2018-08-15 PROCEDURE — 80048 BASIC METABOLIC PNL TOTAL CA: CPT | Performed by: SURGERY

## 2018-08-15 PROCEDURE — 85027 COMPLETE CBC AUTOMATED: CPT | Performed by: SURGERY

## 2018-08-15 RX ORDER — IBUPROFEN 800 MG/1
800 TABLET ORAL
Qty: 30 TAB | Refills: 2 | Status: SHIPPED | OUTPATIENT
Start: 2018-08-15 | End: 2022-04-13

## 2018-08-15 RX ORDER — GABAPENTIN 100 MG/1
200 CAPSULE ORAL 3 TIMES DAILY
Qty: 60 CAP | Refills: 1 | Status: SHIPPED | OUTPATIENT
Start: 2018-08-15 | End: 2019-12-10

## 2018-08-15 RX ADMIN — HEPARIN SODIUM 5000 UNITS: 5000 INJECTION INTRAVENOUS; SUBCUTANEOUS at 08:22

## 2018-08-15 RX ADMIN — HEPARIN SODIUM 5000 UNITS: 5000 INJECTION INTRAVENOUS; SUBCUTANEOUS at 00:35

## 2018-08-15 RX ADMIN — SIMVASTATIN 10 MG: 10 TABLET, FILM COATED ORAL at 08:23

## 2018-08-15 RX ADMIN — Medication 10 ML: at 14:00

## 2018-08-15 RX ADMIN — KETOROLAC TROMETHAMINE 30 MG: 30 INJECTION, SOLUTION INTRAMUSCULAR at 06:45

## 2018-08-15 RX ADMIN — GABAPENTIN 200 MG: 100 CAPSULE ORAL at 08:23

## 2018-08-15 RX ADMIN — KETOROLAC TROMETHAMINE 30 MG: 30 INJECTION, SOLUTION INTRAMUSCULAR at 15:11

## 2018-08-15 RX ADMIN — GABAPENTIN 200 MG: 100 CAPSULE ORAL at 15:11

## 2018-08-15 RX ADMIN — Medication 10 ML: at 06:35

## 2018-08-15 RX ADMIN — HEPARIN SODIUM 5000 UNITS: 5000 INJECTION INTRAVENOUS; SUBCUTANEOUS at 15:11

## 2018-08-15 RX ADMIN — ACETAMINOPHEN 1000 MG: 10 INJECTION, SOLUTION INTRAVENOUS at 00:35

## 2018-08-15 RX ADMIN — OLMESARTAN MEDOXOMIL 40 MG: 20 TABLET, FILM COATED ORAL at 08:23

## 2018-08-15 RX ADMIN — HYDROCHLOROTHIAZIDE 12.5 MG: 25 TABLET ORAL at 08:23

## 2018-08-15 RX ADMIN — DULOXETINE HYDROCHLORIDE 60 MG: 60 CAPSULE, DELAYED RELEASE ORAL at 08:23

## 2018-08-15 RX ADMIN — ACETAMINOPHEN 1000 MG: 10 INJECTION, SOLUTION INTRAVENOUS at 08:22

## 2018-08-15 RX ADMIN — PANTOPRAZOLE SODIUM 40 MG: 40 TABLET, DELAYED RELEASE ORAL at 08:23

## 2018-08-15 RX ADMIN — KETOROLAC TROMETHAMINE 30 MG: 30 INJECTION, SOLUTION INTRAMUSCULAR at 00:36

## 2018-08-15 RX ADMIN — SERTRALINE HYDROCHLORIDE 25 MG: 50 TABLET ORAL at 08:23

## 2018-08-15 NOTE — DISCHARGE INSTRUCTIONS
Diverticulitis: Care Instructions  Your Care Instructions    Diverticulitis occurs when pouches form in the wall of the colon and become inflamed or infected. It can be very painful. Doctors aren't sure what causes diverticulitis. There is no proof that foods such as nuts, seeds, or berries cause it or make it worse. A low-fiber diet may cause the colon to work harder to push stool forward. Pouches may form because of this extra work. It may be hard to think about healthy eating while you're in pain. But as you recover, you might think about how you can use healthy eating for overall better health. Healthy eating may help you avoid future attacks. Follow-up care is a key part of your treatment and safety. Be sure to make and go to all appointments, and call your doctor if you are having problems. It's also a good idea to know your test results and keep a list of the medicines you take. How can you care for yourself at home? · Drink plenty of fluids, enough so that your urine is light yellow or clear like water. If you have kidney, heart, or liver disease and have to limit fluids, talk with your doctor before you increase the amount of fluids you drink. · Stick to liquids or a bland diet (plain rice, bananas, dry toast or crackers, applesauce) until you are feeling better. Then you can return to regular foods and gradually increase the amount of fiber in your diet. · Use a heating pad set on low on your belly to relieve mild cramps and pain. · Get extra rest until you are feeling better. · Be safe with medicines. Read and follow all instructions on the label. ¨ If the doctor gave you a prescription medicine for pain, take it as prescribed. ¨ If you are not taking a prescription pain medicine, ask your doctor if you can take an over-the-counter medicine. · If your doctor prescribed antibiotics, take them as directed. Do not stop taking them just because you feel better.  You need to take the full course of antibiotics. To prevent future attacks of diverticulitis  · Avoid constipation:  ¨ Include fruits, vegetables, beans, and whole grains in your diet each day. These foods are high in fiber. ¨ Drink plenty of fluids, enough so that your urine is light yellow or clear like water. If you have kidney, heart, or liver disease and have to limit fluids, talk with your doctor before you increase the amount of fluids you drink. ¨ Get some exercise every day. Build up slowly to 30 to 60 minutes a day on 5 or more days of the week. ¨ Take a fiber supplement, such as Citrucel or Metamucil, every day if needed. Read and follow all instructions on the label. ¨ Schedule time each day for a bowel movement. Having a daily routine may help. Take your time and do not strain when having a bowel movement. When should you call for help? Call your doctor now or seek immediate medical care if:    · You have a fever.     · You are vomiting.     · You have new or worse belly pain.     · You cannot pass stools or gas.    Watch closely for changes in your health, and be sure to contact your doctor if you have any problems. Where can you learn more? Go to http://kasey-ignacio.info/. Enter H901 in the search box to learn more about \"Diverticulitis: Care Instructions. \"  Current as of: May 12, 2017  Content Version: 11.7  © 0365-1650 [employer]. Care instructions adapted under license by [employer] (which disclaims liability or warranty for this information). If you have questions about a medical condition or this instruction, always ask your healthcare professional. [place] [address] any warranty or liability for your use of this information.       Post-Operative Discharge Instructions  Rupesh Carr DO, [address] , [facility]  [address] [place] [address] Thalia Menendez  [phone]    Patient: Hilaria Hernadez MRN: [id number]  CSN: [id number]    YOB: 1955  Age: 58 y.o. Sex: female    DOA: 8/13/2018 LOS:  LOS: 2 days   Discharge Date:      Acute Diagnoses:  DIVERTICULITIS WITH SIGMOID STRICTURE  Stricture of sigmoid colon ([facility]    Chronic Medical Diagnoses:  Problem List as of 8/15/2018  Date Reviewed: 8/13/2018          Codes Class Noted - Resolved    * (Principal)Stricture of sigmoid colon ([facility] ICD-10-CM: P30.691  ICD-9-CM: 560.9  8/13/2018 - Present        GERD (gastroesophageal reflux disease) ICD-10-CM: K21.9  ICD-9-CM: 530.81  8/13/2018 - Present        Essential hypertension ICD-10-CM: I10  ICD-9-CM: 401.9  8/13/2018 - Present        Hyperlipidemia ICD-10-CM: E78.5  ICD-9-CM: 272.4  8/13/2018 - Present        History of gastric restrictive surgery ICD-10-CM: Z98.84  ICD-9-CM: V45.86  8/13/2018 - Present        Fibromyalgia ICD-10-CM: M79.7  ICD-9-CM: 729.1  8/13/2018 - Present        Arthritis ICD-10-CM: M19.90  ICD-9-CM: 716.90  8/13/2018 - Present    Overview Signed 8/13/2018  3:44 PM by Kyle Odell DO     spondylosis                   Diet  1. Resume prior to surgery diet as tolerated. Activity  1. Do not drive a car or operate any hazardous machinery the day of surgery. 2. Rest quietly today. 3. No bending or heavy lifting. 4. You may resume other prior to surgery activities as tolerated. 5. You may remove the bandage and shower in 1 day. Drain / Wound Care  1. Follow all drain / wound care instructions exactly as explained by the Nurse at time of discharge. 2. Apply an ice pack to the surgical site for 48 hours. 3. Do not put any salves or ointments on the wound. Allow it to form a dry scab. 4. Leave steri-strips / Dermabond alone. They should be allowed to fall off on their own in 7-14 days. Medications  1. It is important to take your medications exactly as they are prescribed.   2. Keep your medication in the bottles provided by the pharmacist, and keep a list of the medication names, dosages, and times they should be taken in your wallet. Call 911 anytime you think you may need emergency care. For example, call if:  · You passed out (lost consciousness). · You have severe trouble breathing. · You have sudden chest pain and shortness of breath. Notify your Surgeon for any of the followin. Fever, chills, nausea, vomiting, severe abdominal pain or bleeding. 2. If you experience any redness or discharge or sign of infection. 3. Persistent nausea lasting more than 24 hours. If you are unable to reach your Surgeon for any of the symptoms above, you should proceed directly to the nearest Emergency Department. Post-Operative Appointment Information    Call Dr. Jasmin Burnett office tomorrow morning at [phone] to schedule a post-operative office visit in two (2) weeks. If any questions or concerns arise, call your Surgeon at [phone].

## 2018-08-15 NOTE — DISCHARGE SUMMARY
Discharge Summary    Patient: Enmanuel Valdez MRN: [id number]  CSN: [id number]    YOB: 1955  Age: 58 y.o. Sex: female    DOA: 8/13/2018 LOS:  LOS: 2 days   Discharge Date:      Admission Diagnoses: DIVERTICULITIS WITH SIGMOID STRICTURE  Stricture of sigmoid colon [facility])    Discharge Diagnoses:    Problem List as of 8/15/2018  Date Reviewed: 8/13/2018          Codes Class Noted - Resolved    * (Principal)Stricture of sigmoid colon ([facility] ICD-10-CM: K22.700  ICD-9-CM: 560.9  8/13/2018 - Present        GERD (gastroesophageal reflux disease) ICD-10-CM: K21.9  ICD-9-CM: 530.81  8/13/2018 - Present        Essential hypertension ICD-10-CM: I10  ICD-9-CM: 401.9  8/13/2018 - Present        Hyperlipidemia ICD-10-CM: E78.5  ICD-9-CM: 272.4  8/13/2018 - Present        History of gastric restrictive surgery ICD-10-CM: Z98.84  ICD-9-CM: V45.86  8/13/2018 - Present        Fibromyalgia ICD-10-CM: M79.7  ICD-9-CM: 729.1  8/13/2018 - Present        Arthritis ICD-10-CM: M19.90  ICD-9-CM: 716.90  8/13/2018 - Present    Overview Signed 8/13/2018  3:44 PM by Bonnie Hoffman DO     spondylosis                   Discharge Condition: Good    Procedure: Procedure(s):  LAPAROSCOPIC LOW ANTERIOR RESECTION, MOBILIZATION OF SPLENIC FLEXURE, AND VENTRAL HERNIA REPAIR    Surgeon(s): Surgeon(s) and Role:     * Bonnie Hoffman DO - Primary    Hospital Course:  Uncomplicated. Upon discharge the patient was ambulating, urinating, having bowel movements, and tolerating a diet with pain well controlled. It is expected that the patient continues to improve upon discharge. Consults: None    Significant Diagnostic Studies: none    Discharge Medications:     Current Discharge Medication List      START taking these medications    Details   ibuprofen (MOTRIN) 800 mg tablet Take 1 Tab by mouth every eight (8) hours as needed for Pain. Qty: 30 Tab, Refills: 2      gabapentin (NEURONTIN) 100 mg capsule Take 2 Caps by mouth three (3) times daily. Indications: Postoperative Acute Pain  Qty: 60 Cap, Refills: 1         CONTINUE these medications which have NOT CHANGED    Details   olmesartan-hydroCHLOROthiazide (BENICAR HCT) 40-12.5 mg per tablet Take 1 Tab by mouth daily. sertraline (ZOLOFT) 25 mg tablet Take 25 mg by mouth daily. esomeprazole (NEXIUM) 40 mg capsule Take 40 mg by mouth daily. naproxen sodium (ALEVE) 220 mg tablet Take 220 mg by mouth two (2) times daily as needed. acetaminophen (TYLENOL EXTRA STRENGTH) 500 mg tablet Take 1,000 mg by mouth every six (6) hours as needed for Pain. cetirizine (ZYRTEC) 10 mg tablet Take 10 mg by mouth daily. !! senna (SENNA) 8.6 mg tablet Take 5 Tabs by mouth daily. !! sennosides (EX-LAX) 15 mg tablet Take 1-3 Tabs by mouth daily as needed for Constipation. DULoxetine (CYMBALTA) 60 mg capsule Take 60 mg by mouth daily. Indications: FIBROMYALGIA      simvastatin (ZOCOR) 10 mg tablet Take  by mouth daily. Indications: HYPERCHOLESTEROLEMIA       !! - Potential duplicate medications found. Please discuss with provider.           Activity: Activity as tolerated, No heavy lifting for 6 weeks and See surgical instructions    Diet: Regular Diet    Wound Care: Keep wound clean and dry, Ice to area for comfort and As directed    Follow-up: 2 weeks

## 2020-01-14 ENCOUNTER — ANESTHESIA EVENT (OUTPATIENT)
Dept: SURGERY | Age: 65
End: 2020-01-14
Payer: MEDICAID

## 2020-01-14 RX ORDER — INSULIN LISPRO 100 [IU]/ML
INJECTION, SOLUTION INTRAVENOUS; SUBCUTANEOUS ONCE
Status: CANCELLED | OUTPATIENT
Start: 2020-01-14 | End: 2020-01-15

## 2020-01-14 RX ORDER — SODIUM CHLORIDE, SODIUM LACTATE, POTASSIUM CHLORIDE, CALCIUM CHLORIDE 600; 310; 30; 20 MG/100ML; MG/100ML; MG/100ML; MG/100ML
1000 INJECTION, SOLUTION INTRAVENOUS CONTINUOUS
Status: CANCELLED | OUTPATIENT
Start: 2020-01-14

## 2020-01-14 RX ORDER — NALOXONE HYDROCHLORIDE 0.4 MG/ML
0.1 INJECTION, SOLUTION INTRAMUSCULAR; INTRAVENOUS; SUBCUTANEOUS AS NEEDED
Status: CANCELLED | OUTPATIENT
Start: 2020-01-14

## 2020-01-14 RX ORDER — FENTANYL CITRATE 50 UG/ML
25 INJECTION, SOLUTION INTRAMUSCULAR; INTRAVENOUS AS NEEDED
Status: CANCELLED | OUTPATIENT
Start: 2020-01-14

## 2020-01-14 RX ORDER — SODIUM CHLORIDE 0.9 % (FLUSH) 0.9 %
5-40 SYRINGE (ML) INJECTION AS NEEDED
Status: CANCELLED | OUTPATIENT
Start: 2020-01-14

## 2020-01-14 RX ORDER — SODIUM CHLORIDE 0.9 % (FLUSH) 0.9 %
5-40 SYRINGE (ML) INJECTION EVERY 8 HOURS
Status: CANCELLED | OUTPATIENT
Start: 2020-01-14

## 2020-01-14 RX ORDER — MAGNESIUM SULFATE 100 %
4 CRYSTALS MISCELLANEOUS AS NEEDED
Status: CANCELLED | OUTPATIENT
Start: 2020-01-14

## 2020-01-14 RX ORDER — FLUMAZENIL 0.1 MG/ML
0.2 INJECTION INTRAVENOUS
Status: CANCELLED | OUTPATIENT
Start: 2020-01-14

## 2020-01-14 RX ORDER — DEXTROSE MONOHYDRATE 100 MG/ML
125-250 INJECTION, SOLUTION INTRAVENOUS AS NEEDED
Status: CANCELLED | OUTPATIENT
Start: 2020-01-14

## 2020-01-15 ENCOUNTER — HOSPITAL ENCOUNTER (OUTPATIENT)
Age: 65
Setting detail: OUTPATIENT SURGERY
Discharge: HOME | End: 2020-01-15
Attending: OPHTHALMOLOGY | Admitting: OPHTHALMOLOGY
Payer: MEDICAID

## 2020-01-15 ENCOUNTER — ANESTHESIA (OUTPATIENT)
Dept: SURGERY | Age: 65
End: 2020-01-15
Payer: MEDICAID

## 2020-01-15 VITALS
RESPIRATION RATE: 18 BRPM | TEMPERATURE: 97.2 F | HEIGHT: 67 IN | SYSTOLIC BLOOD PRESSURE: 109 MMHG | DIASTOLIC BLOOD PRESSURE: 60 MMHG | WEIGHT: 240.56 LBS | BODY MASS INDEX: 37.76 KG/M2 | OXYGEN SATURATION: 99 % | HEART RATE: 66 BPM

## 2020-01-15 PROBLEM — H26.9 CATARACT: Status: ACTIVE | Noted: 2020-01-15

## 2020-01-15 PROCEDURE — 77030006685 HC BLD OPHTH ALCN -B: Performed by: OPHTHALMOLOGY

## 2020-01-15 PROCEDURE — 76060000031 HC ANESTHESIA FIRST 0.5 HR: Performed by: OPHTHALMOLOGY

## 2020-01-15 PROCEDURE — 74011250636 HC RX REV CODE- 250/636: Performed by: OPHTHALMOLOGY

## 2020-01-15 PROCEDURE — V2632 POST CHMBR INTRAOCULAR LENS: HCPCS | Performed by: OPHTHALMOLOGY

## 2020-01-15 PROCEDURE — 76010000154 HC OR TIME FIRST 0.5 HR: Performed by: OPHTHALMOLOGY

## 2020-01-15 PROCEDURE — 76210000020 HC REC RM PH II FIRST 0.5 HR: Performed by: OPHTHALMOLOGY

## 2020-01-15 PROCEDURE — 74011000250 HC RX REV CODE- 250: Performed by: OPHTHALMOLOGY

## 2020-01-15 PROCEDURE — 77030013851 HC PK PHACO KT ALCN -B: Performed by: OPHTHALMOLOGY

## 2020-01-15 PROCEDURE — 77030031761 HC CYSTOTOM OPHTH IRR SYS BVTC -A: Performed by: OPHTHALMOLOGY

## 2020-01-15 PROCEDURE — 77030018837 HC SOL IRR OPTH ALCN -A: Performed by: OPHTHALMOLOGY

## 2020-01-15 PROCEDURE — 77030018832 HC SOL IRR H20 ICUM -A: Performed by: OPHTHALMOLOGY

## 2020-01-15 PROCEDURE — 74011250636 HC RX REV CODE- 250/636: Performed by: ANESTHESIOLOGY

## 2020-01-15 PROCEDURE — 77030013389: Performed by: OPHTHALMOLOGY

## 2020-01-15 PROCEDURE — 77030040361 HC SLV COMPR DVT MDII -B: Performed by: OPHTHALMOLOGY

## 2020-01-15 PROCEDURE — 77030020782 HC GWN BAIR PAWS FLX 3M -B: Performed by: OPHTHALMOLOGY

## 2020-01-15 DEVICE — LENS POST SGL PC 6 13 21.5: Type: IMPLANTABLE DEVICE | Site: EYE | Status: FUNCTIONAL

## 2020-01-15 RX ORDER — TROPICAMIDE 10 MG/ML
1 SOLUTION/ DROPS OPHTHALMIC
Status: COMPLETED | OUTPATIENT
Start: 2020-01-15 | End: 2020-01-15

## 2020-01-15 RX ORDER — LIDOCAINE HYDROCHLORIDE 10 MG/ML
INJECTION, SOLUTION EPIDURAL; INFILTRATION; INTRACAUDAL; PERINEURAL AS NEEDED
Status: DISCONTINUED | OUTPATIENT
Start: 2020-01-15 | End: 2020-01-15 | Stop reason: HOSPADM

## 2020-01-15 RX ORDER — PHENYLEPHRINE HYDROCHLORIDE 25 MG/ML
1 SOLUTION/ DROPS OPHTHALMIC
Status: COMPLETED | OUTPATIENT
Start: 2020-01-15 | End: 2020-01-15

## 2020-01-15 RX ORDER — EPINEPHRINE 1 MG/ML
INJECTION, SOLUTION, CONCENTRATE INTRAVENOUS AS NEEDED
Status: DISCONTINUED | OUTPATIENT
Start: 2020-01-15 | End: 2020-01-15 | Stop reason: HOSPADM

## 2020-01-15 RX ORDER — SODIUM CHLORIDE, SODIUM LACTATE, POTASSIUM CHLORIDE, CALCIUM CHLORIDE 600; 310; 30; 20 MG/100ML; MG/100ML; MG/100ML; MG/100ML
75 INJECTION, SOLUTION INTRAVENOUS CONTINUOUS
Status: DISCONTINUED | OUTPATIENT
Start: 2020-01-15 | End: 2020-01-15 | Stop reason: HOSPADM

## 2020-01-15 RX ORDER — MIDAZOLAM HYDROCHLORIDE 1 MG/ML
INJECTION, SOLUTION INTRAMUSCULAR; INTRAVENOUS AS NEEDED
Status: DISCONTINUED | OUTPATIENT
Start: 2020-01-15 | End: 2020-01-15 | Stop reason: HOSPADM

## 2020-01-15 RX ORDER — ONDANSETRON 2 MG/ML
INJECTION INTRAMUSCULAR; INTRAVENOUS AS NEEDED
Status: DISCONTINUED | OUTPATIENT
Start: 2020-01-15 | End: 2020-01-15 | Stop reason: HOSPADM

## 2020-01-15 RX ORDER — FENTANYL CITRATE 50 UG/ML
INJECTION, SOLUTION INTRAMUSCULAR; INTRAVENOUS AS NEEDED
Status: DISCONTINUED | OUTPATIENT
Start: 2020-01-15 | End: 2020-01-15 | Stop reason: HOSPADM

## 2020-01-15 RX ADMIN — TROPICAMIDE 1 DROP: 10 SOLUTION/ DROPS OPHTHALMIC at 11:06

## 2020-01-15 RX ADMIN — PHENYLEPHRINE HYDROCHLORIDE 1 DROP: 2.5 SOLUTION/ DROPS OPHTHALMIC at 11:12

## 2020-01-15 RX ADMIN — TROPICAMIDE 1 DROP: 10 SOLUTION/ DROPS OPHTHALMIC at 11:22

## 2020-01-15 RX ADMIN — TROPICAMIDE 1 DROP: 10 SOLUTION/ DROPS OPHTHALMIC at 11:12

## 2020-01-15 RX ADMIN — MIDAZOLAM 0.5 MG: 1 INJECTION INTRAMUSCULAR; INTRAVENOUS at 12:28

## 2020-01-15 RX ADMIN — PHENYLEPHRINE HYDROCHLORIDE 1 DROP: 2.5 SOLUTION/ DROPS OPHTHALMIC at 11:22

## 2020-01-15 RX ADMIN — ONDANSETRON HYDROCHLORIDE 4 MG: 2 INJECTION INTRAMUSCULAR; INTRAVENOUS at 12:20

## 2020-01-15 RX ADMIN — FENTANYL CITRATE 25 MCG: 50 INJECTION, SOLUTION INTRAMUSCULAR; INTRAVENOUS at 12:24

## 2020-01-15 RX ADMIN — PHENYLEPHRINE HYDROCHLORIDE 1 DROP: 2.5 SOLUTION/ DROPS OPHTHALMIC at 11:06

## 2020-01-15 RX ADMIN — FENTANYL CITRATE 50 MCG: 50 INJECTION, SOLUTION INTRAMUSCULAR; INTRAVENOUS at 12:20

## 2020-01-15 RX ADMIN — FENTANYL CITRATE 25 MCG: 50 INJECTION, SOLUTION INTRAMUSCULAR; INTRAVENOUS at 12:23

## 2020-01-15 RX ADMIN — MIDAZOLAM 2 MG: 1 INJECTION INTRAMUSCULAR; INTRAVENOUS at 12:15

## 2020-01-15 RX ADMIN — MIDAZOLAM 1 MG: 1 INJECTION INTRAMUSCULAR; INTRAVENOUS at 12:24

## 2020-01-15 RX ADMIN — LIDOCAINE HYDROCHLORIDE 2 DROP: 35 GEL OPHTHALMIC at 11:24

## 2020-01-15 RX ADMIN — PHENYLEPHRINE HYDROCHLORIDE 1 DROP: 2.5 SOLUTION/ DROPS OPHTHALMIC at 11:17

## 2020-01-15 RX ADMIN — SODIUM CHLORIDE, SODIUM LACTATE, POTASSIUM CHLORIDE, AND CALCIUM CHLORIDE 75 ML/HR: 600; 310; 30; 20 INJECTION, SOLUTION INTRAVENOUS at 11:36

## 2020-01-15 RX ADMIN — TROPICAMIDE 1 DROP: 10 SOLUTION/ DROPS OPHTHALMIC at 11:17

## 2020-01-15 RX ADMIN — MIDAZOLAM 0.5 MG: 1 INJECTION INTRAMUSCULAR; INTRAVENOUS at 12:22

## 2020-01-15 NOTE — ANESTHESIA POSTPROCEDURE EVALUATION
Procedure(s):  CATARACT EXTRACTION WITH INTRA OCULAR LENS IMPLANT LEFT EYE. MAC    Anesthesia Post Evaluation        Comments: Post-Anesthesia Evaluation & Assessment    Patient hemodynamically stable    No untreated/active PONV    Post-operative hydration adequate. Adequate post-operative analgesia per PACU discharge criteria    Mental status & level of consciousness: alert and oriented x 3    Respiratory status: patent unassisted airway     No apparent anesthetic complications requiring additional post-anesthetic care    Patient has met all discharge requirements. Susi Cadet MD          Vitals Value Taken Time   /54 1/15/2020  1:00 PM   Temp     Pulse 65 1/15/2020  1:02 PM   Resp 16 1/15/2020 12:52 PM   SpO2 96 % 1/15/2020  1:02 PM   Vitals shown include unvalidated device data.

## 2020-01-15 NOTE — DISCHARGE INSTRUCTIONS
Post-Operative Cataract Instructions  [facility] INVASIVE SURGERY [facility]  Sabina Goins M.D. Marlene Dia. Edd Jc M.D.  [address] Miranda Blackwell, [facility]  [phone]      Diet  1. Resume normal diet. 2. Do not drink alcoholic beverages, including beer for 24 hours. Activity  1. Do not drive a car or operate any hazardous machinery the day of surgery. 2. You may resume normal activities as tolerated. 3. No bending or heavy lifting. 4. No reading or computer work after your surgery. You may watch TV. Wound Care  1. Anticipate that your eye will tear and water. 2. You may also experience a sensation of a foreign body, sand, or grit in the surgical eye, this is normal.  3. Do not touch the affected eye. 4. ** Do not remove eye shield unless directed to do so by your physician. **  5. Wear eye shield when resting or sleeping for one week. Medications  1. Take Tylenol Extra Strength two (2) tablets by mouth upon arrival home and then every four (4) hours as needed for discomfort. 2. Regarding Eye drops:  -  Begin using your eye drops as directed by your physician in the (left) operative eye. One drop of     []   Zymar    [x]  Vigamox   along with one (1) drop     []  Acular    [x]  Voltaren   every four (4) hours while awake. Wait five (5) minutes then apply one (1) drop     []  Pred Forte    [x]  Econopred Plus   every four (4) hours while awake. 3. If you take glaucoma medications, continue to do so unless the physician otherwise instructs you. 4. You may use artificial tears as needed if your eye feels scratchy. Notify your Physician Immediately for any of the followin. Excessive pain not relieved by Tylenol especially headache or increasing pressure to the operative eye. 2. Persistent nausea lasting more than eight hours. 3. If any vomiting occurs. If any questions or concerns arise, call your Surgeon at [phone].       Follow eye drop schedule from office        DISCHARGE SUMMARY from Nurse    PATIENT INSTRUCTIONS:    After general anesthesia or intravenous sedation, for 24 hours or while taking prescription Narcotics:  · Limit your activities  · Do not drive and operate hazardous machinery  · Do not make important personal or business decisions  · Do  not drink alcoholic beverages  · If you have not urinated within 8 hours after discharge, please contact your surgeon on call. Report the following to your surgeon:  · Excessive pain, swelling, redness or odor of or around the surgical area  · Temperature over 100.5  · Nausea and vomiting lasting longer than 4 hours or if unable to take medications  · Any signs of decreased circulation or nerve impairment to extremity: change in color, persistent  numbness, tingling, coldness or increase pain  · Any questions    What to do at Home:  Recommended activity: Ambulate in house,     If you experience any of the following symptoms above, please follow up with Dr. Mikhail Esposito. *  Please give a list of your current medications to your Primary Care Provider. *  Please update this list whenever your medications are discontinued, doses are      changed, or new medications (including over-the-counter products) are added. *  Please carry medication information at all times in case of emergency situations. These are general instructions for a healthy lifestyle:    No smoking/ No tobacco products/ Avoid exposure to second hand smoke  Surgeon General's Warning:  Quitting smoking now greatly reduces serious risk to your health.     Obesity, smoking, and sedentary lifestyle greatly increases your risk for illness    A healthy diet, regular physical exercise & weight monitoring are important for maintaining a healthy lifestyle    You may be retaining fluid if you have a history of heart failure or if you experience any of the following symptoms:  Weight gain of 3 pounds or more overnight or 5 pounds in a week, increased swelling in our hands or feet or shortness of breath while lying flat in bed. Please call your doctor as soon as you notice any of these symptoms; do not wait until your next office visit. Patient armband removed and shredded    The discharge information has been reviewed with the patient and caregiver. The patient and caregiver verbalized understanding. Discharge medications reviewed with the patient and caregiver and appropriate educational materials and side effects teaching were provided.   ___________________________________________________________________________________________________________________________________

## 2020-01-15 NOTE — PERIOP NOTES
Arrived to Phase 2 - alert and oriented assisted to recliner - denies c/o pain - sunglasses on - arrived with eye bag, eye drops x 3 , shield, and implant card

## 2020-01-15 NOTE — INTERVAL H&P NOTE
H&P Update: 
Gopal Contreras was seen and examined. History and physical has been reviewed. The patient has been examined.  There have been no significant clinical changes since the completion of the originally dated History and Physical.

## 2020-01-15 NOTE — OP NOTES
Cataract Operative Note      Patient: Nina Sierra               Sex: female          DOA: 1/15/2020         YOB: 1955      Age:  59 y.o.        LOS:  LOS: 0 days     Preoperative Diagnosis: Cataract left eye    Postoperative Diagnosis:  Cataract  left eye  Surgeon: Sylvia Amato MD, M.D. Anesthesia:  Topical anesthesia  Procedure:  Phacoemulsification of posterior chamber for intraocular lens implantation left    Fluids:  0    Procedure in Detail: The operative eye was prepped and draped in the usual fashion. A lid speculum was placed in the operative eye. A clear cornea approach was utilized. A paracentesis incision(s) was constructed with a 1 mm slit knife. One percent preservative-free lidocaine followed by viscoelastic was instilled into the anterior chamber. A clear corneal incision was made with a slit knife. A continuous curvilinear capsulorrhexis was constructed followed by hydrodissection. A phacoemulsification tip was placed into the eye, and the lens nucleus was emulsified. The irrigation/aspiration device was then used to remove any remaining cortical material.  Polishing of the capsule was performed as needed. The intraocular lens was then placed into the capsular bag after it was re-inflated with viscoelastic. The remaining viscoelastic was then removed using the irrigation/aspiration device. BSS on a cannula was then used to hydrate the wound edges. At the end of the procedure the wound was found to be watertight, the anterior chamber was deep and the pupil round. No blood loss during surgery. An antibiotic and anti-inflammatroy was placed into the operative eye. The lid speculum was removed. Protective sunglasses were then placed onto the patient. The patient was taken to the [address] [place] [address] Unit (PACU) in good condition having tolerated the procedure well. Estimated Blood Loss: 0                 Implants:   Implant Name Type Inv.  Item Serial No.  Lot No. LRB No. Used Action   LENS POST SGL PC 6 13 21.5 -- [employer] - [id number] Intraocular Lens LENS POST SGL PC 6 13 21.5 -- [employer] [id number] [employer]  Left 1 Implanted       Specimens: * No specimens in log *            Complications:  None           Maame Alas MD, M.D.  [unfilled]  12:34 PM

## 2020-01-15 NOTE — PERIOP NOTES
Reviewed PTA medication list with patient/caregiver and patient/caregiver denies any additional medications. Patient admits to having a responsible adult care for them for at least 24 hours after surgery.     Dual skin assessment completed by Azra Streeter RN and Kassie Esposito RN.

## 2020-01-15 NOTE — ANESTHESIA PREPROCEDURE EVALUATION
Anesthetic History   No history of anesthetic complications            Review of Systems / Medical History  Patient summary reviewed, nursing notes reviewed and pertinent labs reviewed    Pulmonary  Within defined limits                 Neuro/Psych         Psychiatric history     Cardiovascular    Hypertension          Hyperlipidemia    Exercise tolerance: >4 METS     GI/Hepatic/Renal     GERD: well controlled    Renal disease    Pertinent negatives: No liver disease   Endo/Other        Morbid obesity and arthritis     Other Findings            Physical Exam    Airway  Mallampati: II    Neck ROM: normal range of motion   Mouth opening: Normal     Cardiovascular  Regular rate and rhythm,  S1 and S2 normal,  no murmur, click, rub, or gallop             Dental    Dentition: Full upper dentures and Lower partial plate     Pulmonary                 Abdominal  GI exam deferred       Other Findings            Anesthetic Plan    ASA: 3  Anesthesia type: MAC          Induction: Intravenous  Anesthetic plan and risks discussed with: Patient

## 2020-02-04 ENCOUNTER — ANESTHESIA EVENT (OUTPATIENT)
Dept: SURGERY | Age: 65
End: 2020-02-04
Payer: MEDICAID

## 2020-02-04 RX ORDER — SODIUM CHLORIDE, SODIUM LACTATE, POTASSIUM CHLORIDE, CALCIUM CHLORIDE 600; 310; 30; 20 MG/100ML; MG/100ML; MG/100ML; MG/100ML
1000 INJECTION, SOLUTION INTRAVENOUS CONTINUOUS
Status: CANCELLED | OUTPATIENT
Start: 2020-02-04

## 2020-02-04 RX ORDER — MAGNESIUM SULFATE 100 %
4 CRYSTALS MISCELLANEOUS AS NEEDED
Status: CANCELLED | OUTPATIENT
Start: 2020-02-04

## 2020-02-04 RX ORDER — SODIUM CHLORIDE 0.9 % (FLUSH) 0.9 %
5-40 SYRINGE (ML) INJECTION EVERY 8 HOURS
Status: CANCELLED | OUTPATIENT
Start: 2020-02-04

## 2020-02-04 RX ORDER — SODIUM CHLORIDE 0.9 % (FLUSH) 0.9 %
5-40 SYRINGE (ML) INJECTION AS NEEDED
Status: CANCELLED | OUTPATIENT
Start: 2020-02-04

## 2020-02-04 RX ORDER — NALOXONE HYDROCHLORIDE 0.4 MG/ML
0.1 INJECTION, SOLUTION INTRAMUSCULAR; INTRAVENOUS; SUBCUTANEOUS AS NEEDED
Status: CANCELLED | OUTPATIENT
Start: 2020-02-04

## 2020-02-04 RX ORDER — FLUMAZENIL 0.1 MG/ML
0.2 INJECTION INTRAVENOUS
Status: CANCELLED | OUTPATIENT
Start: 2020-02-04

## 2020-02-05 ENCOUNTER — HOSPITAL ENCOUNTER (OUTPATIENT)
Age: 65
Setting detail: OUTPATIENT SURGERY
Discharge: HOME | End: 2020-02-05
Attending: OPHTHALMOLOGY | Admitting: OPHTHALMOLOGY
Payer: MEDICAID

## 2020-02-05 ENCOUNTER — ANESTHESIA (OUTPATIENT)
Dept: SURGERY | Age: 65
End: 2020-02-05
Payer: MEDICAID

## 2020-02-05 VITALS
TEMPERATURE: 97.3 F | HEART RATE: 57 BPM | RESPIRATION RATE: 12 BRPM | DIASTOLIC BLOOD PRESSURE: 58 MMHG | WEIGHT: 246.2 LBS | BODY MASS INDEX: 38.64 KG/M2 | HEIGHT: 67 IN | SYSTOLIC BLOOD PRESSURE: 111 MMHG | OXYGEN SATURATION: 100 %

## 2020-02-05 PROCEDURE — 77030018837 HC SOL IRR OPTH ALCN -A: Performed by: OPHTHALMOLOGY

## 2020-02-05 PROCEDURE — 76010000154 HC OR TIME FIRST 0.5 HR: Performed by: OPHTHALMOLOGY

## 2020-02-05 PROCEDURE — 74011250636 HC RX REV CODE- 250/636: Performed by: OPHTHALMOLOGY

## 2020-02-05 PROCEDURE — 77030013389: Performed by: OPHTHALMOLOGY

## 2020-02-05 PROCEDURE — 77030006685 HC BLD OPHTH ALCN -B: Performed by: OPHTHALMOLOGY

## 2020-02-05 PROCEDURE — 76210000020 HC REC RM PH II FIRST 0.5 HR: Performed by: OPHTHALMOLOGY

## 2020-02-05 PROCEDURE — 77030013851 HC PK PHACO KT ALCN -B: Performed by: OPHTHALMOLOGY

## 2020-02-05 PROCEDURE — 76060000031 HC ANESTHESIA FIRST 0.5 HR: Performed by: OPHTHALMOLOGY

## 2020-02-05 PROCEDURE — 77030031761 HC CYSTOTOM OPHTH IRR SYS BVTC -A: Performed by: OPHTHALMOLOGY

## 2020-02-05 PROCEDURE — 77030040361 HC SLV COMPR DVT MDII -B: Performed by: OPHTHALMOLOGY

## 2020-02-05 PROCEDURE — 77030018832 HC SOL IRR H20 ICUM -A: Performed by: OPHTHALMOLOGY

## 2020-02-05 PROCEDURE — 74011000250 HC RX REV CODE- 250: Performed by: OPHTHALMOLOGY

## 2020-02-05 PROCEDURE — 77030020782 HC GWN BAIR PAWS FLX 3M -B: Performed by: OPHTHALMOLOGY

## 2020-02-05 PROCEDURE — V2632 POST CHMBR INTRAOCULAR LENS: HCPCS | Performed by: OPHTHALMOLOGY

## 2020-02-05 PROCEDURE — 74011250636 HC RX REV CODE- 250/636: Performed by: NURSE ANESTHETIST, CERTIFIED REGISTERED

## 2020-02-05 DEVICE — LENS IO +21.0 DIOPT L13MM DIA6MM 0DEG HAPTIC ANG A CONSTANT: Type: IMPLANTABLE DEVICE | Site: EYE | Status: FUNCTIONAL

## 2020-02-05 RX ORDER — SODIUM CHLORIDE, SODIUM LACTATE, POTASSIUM CHLORIDE, CALCIUM CHLORIDE 600; 310; 30; 20 MG/100ML; MG/100ML; MG/100ML; MG/100ML
75 INJECTION, SOLUTION INTRAVENOUS CONTINUOUS
Status: DISCONTINUED | OUTPATIENT
Start: 2020-02-05 | End: 2020-02-05 | Stop reason: HOSPADM

## 2020-02-05 RX ORDER — LIDOCAINE HYDROCHLORIDE 10 MG/ML
INJECTION, SOLUTION EPIDURAL; INFILTRATION; INTRACAUDAL; PERINEURAL AS NEEDED
Status: DISCONTINUED | OUTPATIENT
Start: 2020-02-05 | End: 2020-02-05 | Stop reason: HOSPADM

## 2020-02-05 RX ORDER — PHENYLEPHRINE HYDROCHLORIDE 25 MG/ML
1 SOLUTION/ DROPS OPHTHALMIC
Status: COMPLETED | OUTPATIENT
Start: 2020-02-05 | End: 2020-02-05

## 2020-02-05 RX ORDER — TROPICAMIDE 10 MG/ML
1 SOLUTION/ DROPS OPHTHALMIC
Status: COMPLETED | OUTPATIENT
Start: 2020-02-05 | End: 2020-02-05

## 2020-02-05 RX ORDER — MIDAZOLAM HYDROCHLORIDE 1 MG/ML
INJECTION, SOLUTION INTRAMUSCULAR; INTRAVENOUS AS NEEDED
Status: DISCONTINUED | OUTPATIENT
Start: 2020-02-05 | End: 2020-02-05 | Stop reason: HOSPADM

## 2020-02-05 RX ORDER — FENTANYL CITRATE 50 UG/ML
INJECTION, SOLUTION INTRAMUSCULAR; INTRAVENOUS AS NEEDED
Status: DISCONTINUED | OUTPATIENT
Start: 2020-02-05 | End: 2020-02-05 | Stop reason: HOSPADM

## 2020-02-05 RX ORDER — ONDANSETRON 2 MG/ML
INJECTION INTRAMUSCULAR; INTRAVENOUS AS NEEDED
Status: DISCONTINUED | OUTPATIENT
Start: 2020-02-05 | End: 2020-02-05 | Stop reason: HOSPADM

## 2020-02-05 RX ORDER — EPINEPHRINE 1 MG/ML
INJECTION, SOLUTION, CONCENTRATE INTRAVENOUS AS NEEDED
Status: DISCONTINUED | OUTPATIENT
Start: 2020-02-05 | End: 2020-02-05 | Stop reason: HOSPADM

## 2020-02-05 RX ADMIN — TROPICAMIDE 1 DROP: 10 SOLUTION/ DROPS OPHTHALMIC at 07:02

## 2020-02-05 RX ADMIN — PHENYLEPHRINE HYDROCHLORIDE 1 DROP: 2.5 SOLUTION/ DROPS OPHTHALMIC at 06:57

## 2020-02-05 RX ADMIN — FENTANYL CITRATE 50 MCG: 50 INJECTION, SOLUTION INTRAMUSCULAR; INTRAVENOUS at 08:32

## 2020-02-05 RX ADMIN — FENTANYL CITRATE 50 MCG: 50 INJECTION, SOLUTION INTRAMUSCULAR; INTRAVENOUS at 08:24

## 2020-02-05 RX ADMIN — TROPICAMIDE 1 DROP: 10 SOLUTION/ DROPS OPHTHALMIC at 07:07

## 2020-02-05 RX ADMIN — FENTANYL CITRATE 50 MCG: 50 INJECTION, SOLUTION INTRAMUSCULAR; INTRAVENOUS at 08:34

## 2020-02-05 RX ADMIN — PHENYLEPHRINE HYDROCHLORIDE 1 DROP: 2.5 SOLUTION/ DROPS OPHTHALMIC at 07:02

## 2020-02-05 RX ADMIN — SODIUM CHLORIDE, SODIUM LACTATE, POTASSIUM CHLORIDE, AND CALCIUM CHLORIDE 75 ML/HR: 600; 310; 30; 20 INJECTION, SOLUTION INTRAVENOUS at 07:12

## 2020-02-05 RX ADMIN — PHENYLEPHRINE HYDROCHLORIDE 1 DROP: 2.5 SOLUTION/ DROPS OPHTHALMIC at 07:12

## 2020-02-05 RX ADMIN — PHENYLEPHRINE HYDROCHLORIDE 1 DROP: 2.5 SOLUTION/ DROPS OPHTHALMIC at 07:07

## 2020-02-05 RX ADMIN — MIDAZOLAM 2 MG: 1 INJECTION INTRAMUSCULAR; INTRAVENOUS at 08:21

## 2020-02-05 RX ADMIN — LIDOCAINE HYDROCHLORIDE 2 DROP: 35 GEL OPHTHALMIC at 07:12

## 2020-02-05 RX ADMIN — TROPICAMIDE 1 DROP: 10 SOLUTION/ DROPS OPHTHALMIC at 06:57

## 2020-02-05 RX ADMIN — ONDANSETRON HYDROCHLORIDE 4 MG: 2 INJECTION INTRAMUSCULAR; INTRAVENOUS at 08:29

## 2020-02-05 RX ADMIN — TROPICAMIDE 1 DROP: 10 SOLUTION/ DROPS OPHTHALMIC at 07:12

## 2020-02-05 NOTE — ANESTHESIA POSTPROCEDURE EVALUATION
Procedure(s):  CATARACT EXTRACTION WITH INTRA OCULAR LENS IMPLANT- RIGHT EYE. MAC    Anesthesia Post Evaluation        Comments: Post-Anesthesia Evaluation and Assessment    Cardiovascular Function/Vital Signs  /58   Pulse (!) 57   Temp 36.3 °C (97.3 °F)   Resp 12   Ht 5' 7\" (1.702 m)   Wt 111.7 kg (246 lb 3.2 oz)   SpO2 100%   BMI 38.56 kg/m²     Patient is status post Procedure(s):  CATARACT EXTRACTION WITH INTRA OCULAR LENS IMPLANT- RIGHT EYE. Nausea/Vomiting: Controlled. Postoperative hydration reviewed and adequate. Pain:  Pain Scale 1: Numeric (0 - 10) (02/05/20 0914)  Pain Intensity 1: 0 (02/05/20 0914)   Managed. Neurological Status:   Neuro (WDL): Within Defined Limits (02/05/20 0856)   At baseline. Mental Status and Level of Consciousness: Arousable. Pulmonary Status:   O2 Device: Room air (02/05/20 0856)   Adequate oxygenation and airway patent. Complications related to anesthesia: None    Post-anesthesia assessment completed. No concerns. Patient has met all discharge requirements. Signed By: Sandy Reece MD    February 5, 2020                   No vitals data found for the desired time range.

## 2020-02-05 NOTE — OP NOTES
Cataract Operative Note      Patient: Lovely Ford               Sex: female          DOA: 2/5/2020         YOB: 1955      Age:  59 y.o.        LOS:  LOS: 0 days     Preoperative Diagnosis: Cataract right eye    Postoperative Diagnosis:  Cataract  right eye  Surgeon: Jada Griffin MD, M.D. Anesthesia:  Topical anesthesia  Procedure:  Phacoemulsification of posterior chamber for intraocular lens implantation right    Fluids:  0    Procedure in Detail: The operative eye was prepped and draped in the usual fashion. A lid speculum was placed in the operative eye. A clear cornea approach was utilized. A paracentesis incision(s) was constructed with a 1 mm slit knife. One percent preservative-free lidocaine followed by viscoelastic was instilled into the anterior chamber. A clear corneal incision was made with a slit knife. A continuous curvilinear capsulorrhexis was constructed followed by hydrodissection. A phacoemulsification tip was placed into the eye, and the lens nucleus was emulsified. The irrigation/aspiration device was then used to remove any remaining cortical material.  Polishing of the capsule was performed as needed. The intraocular lens was then placed into the capsular bag after it was re-inflated with viscoelastic. The remaining viscoelastic was then removed using the irrigation/aspiration device. BSS on a cannula was then used to hydrate the wound edges. At the end of the procedure the wound was found to be watertight, the anterior chamber was deep and the pupil round. No blood loss during surgery. An antibiotic and anti-inflammatroy was placed into the operative eye. The lid speculum was removed. Protective sunglasses were then placed onto the patient. The patient was taken to the [address] [place] [address] Unit (PACU) in good condition having tolerated the procedure well. Estimated Blood Loss: 0                 Implants:   Implant Name Type Inv.  Item Serial No.  Lot No. LRB No. Used Action   LENS POST SGL PC 6 13 21.0 -- [employer] - [id number] Intraocular Lens LENS POST SGL PC 6 13 21.0 -- [employer] [id number] [employer]  Right 1 Implanted       Specimens: * No specimens in log *            Complications:  None           Sejal Manzo MD , MMARI.  [unfilled]  8:45 AM

## 2020-02-05 NOTE — PERIOP NOTES
Pt denies having an active cough and confirms being able to lie still for 20 minutes. Patient admits to having a responsible adult care for them for at least 24 hours after surgery.

## 2020-02-05 NOTE — DISCHARGE INSTRUCTIONS
DISCHARGE SUMMARY from Nurse    PATIENT INSTRUCTIONS:    After general anesthesia or intravenous sedation, for 24 hours or while taking prescription Narcotics:  · Limit your activities  · Do not drive and operate hazardous machinery  · Do not make important personal or business decisions  · Do  not drink alcoholic beverages  · If you have not urinated within 8 hours after discharge, please contact your surgeon on call. Report the following to your surgeon:  · Excessive pain, swelling, redness or odor of or around the surgical area  · Temperature over 100.5  · Nausea and vomiting lasting longer than 4 hours or if unable to take medications  · Any signs of decreased circulation or nerve impairment to extremity: change in color, persistent  numbness, tingling, coldness or increase pain  · Any questions    What to do at Home:  Kylie Oliveira FROM DR Yol[address] TO OFFICE ON Thursday AS SCHEDULED    If you experience any of the following symptoms bleeding, nausea, fevers, severe pain, please follow up with dr Ngoc Rahman    *  Please give a list of your current medications to your Primary Care Provider. *  Please update this list whenever your medications are discontinued, doses are      changed, or new medications (including over-the-counter products) are added. *  Please carry medication information at all times in case of emergency situations. These are general instructions for a healthy lifestyle:    No smoking/ No tobacco products/ Avoid exposure to second hand smoke  Surgeon General's Warning:  Quitting smoking now greatly reduces serious risk to your health.     Obesity, smoking, and sedentary lifestyle greatly increases your risk for illness    A healthy diet, regular physical exercise & weight monitoring are important for maintaining a healthy lifestyle    You may be retaining fluid if you have a history of heart failure or if you experience any of the following symptoms:  Weight gain of 3 pounds or more overnight or 5 pounds in a week, increased swelling in our hands or feet or shortness of breath while lying flat in bed. Please call your doctor as soon as you notice any of these symptoms; do not wait until your next office visit. The discharge information has been reviewed with the patient and caregiver. The patient and caregiver verbalized understanding. Discharge medications reviewed with the patient and caregiver and appropriate educational materials and side effects teaching were provided. ___________________________________________________________________________________________________________________________________Post-Operative Cataract Instructions  [facility]  KADE Shah. Edd Jc M.D.  Lang Cal[address]os[address] [facility] [address] [address], [facility]  [phone]      Diet  1. Resume normal diet. 2. Do not drink alcoholic beverages, including beer for 24 hours. Activity  1. Do not drive a car or operate any hazardous machinery the day of surgery. 2. You may resume normal activities as tolerated. 3. No bending or heavy lifting. 4. No reading or computer work after your surgery. You may watch TV. Wound Care  1. Anticipate that your eye will tear and water. 2. You may also experience a sensation of a foreign body, sand, or grit in the surgical eye, this is normal.  3. Do not touch the affected eye. 4. ** Do not remove eye shield unless directed to do so by your physician. **  5. Wear eye shield when resting or sleeping for one week. Medications  1. Take Tylenol Extra Strength two (2) tablets by mouth upon arrival home and then every four (4) hours as needed for discomfort. 2. Regarding Eye drops:  -  Begin using your eye drops as directed by your physician in the (right) operative eye.      One drop of     []   Zymar    [x]  Vigamox   along with one (1) drop     []  Acular [x]  Voltaren   every four (4) hours while awake. Wait five (5) minutes then apply one (1) drop     []  Pred Forte    [x]  Econopred Plus   every four (4) hours while awake. 3. If you take glaucoma medications, continue to do so unless the physician otherwise instructs you. 4. You may use artificial tears as needed if your eye feels scratchy. Notify your Physician Immediately for any of the followin. Excessive pain not relieved by Tylenol especially headache or increasing pressure to the operative eye. 2. Persistent nausea lasting more than eight hours. 3. If any vomiting occurs. If any questions or concerns arise, call your Surgeon at [phone].     Patient armband removed and shredded

## 2020-02-05 NOTE — INTERVAL H&P NOTE
H&P Update: 
Jose Villela was seen and examined. History and physical has been reviewed. The patient has been examined.  There have been no significant clinical changes since the completion of the originally dated History and Physical.

## 2021-12-08 ENCOUNTER — TRANSCRIBE ORDER (OUTPATIENT)
Dept: SCHEDULING | Age: 66
End: 2021-12-08

## 2021-12-08 DIAGNOSIS — K46.9 HERNIA OF ABDOMINAL CAVITY: Primary | ICD-10-CM

## 2021-12-16 ENCOUNTER — HOSPITAL ENCOUNTER (OUTPATIENT)
Dept: ULTRASOUND IMAGING | Age: 66
Discharge: HOME | End: 2021-12-16
Attending: SURGERY
Payer: MEDICARE

## 2021-12-16 ENCOUNTER — HOSPITAL ENCOUNTER (OUTPATIENT)
Dept: GENERAL RADIOLOGY | Age: 66
Discharge: HOME | End: 2021-12-16
Attending: SURGERY
Payer: MEDICARE

## 2021-12-16 DIAGNOSIS — K46.9 HERNIA OF ABDOMINAL CAVITY: ICD-10-CM

## 2021-12-16 PROCEDURE — 74246 X-RAY XM UPR GI TRC 2CNTRST: CPT

## 2021-12-16 PROCEDURE — 76700 US EXAM ABDOM COMPLETE: CPT

## 2021-12-16 PROCEDURE — 74011000250 HC RX REV CODE- 250: Performed by: SURGERY

## 2021-12-16 RX ADMIN — BARIUM SULFATE 100 ML: 980 POWDER, FOR SUSPENSION ORAL at 11:49

## 2021-12-16 RX ADMIN — BARIUM SULFATE 75 G: 960 POWDER, FOR SUSPENSION ORAL at 11:46

## 2021-12-16 RX ADMIN — ANTACID/ANTIFLATULENT 4 G: 380; 550; 10; 10 GRANULE, EFFERVESCENT ORAL at 11:48

## 2021-12-30 ENCOUNTER — HOSPITAL ENCOUNTER (OUTPATIENT)
Dept: NUCLEAR MEDICINE | Age: 66
Discharge: HOME | End: 2021-12-30
Attending: SURGERY
Payer: MEDICARE

## 2021-12-30 VITALS — BODY MASS INDEX: 36.81 KG/M2 | WEIGHT: 235 LBS

## 2021-12-30 DIAGNOSIS — K46.9 HERNIA OF ABDOMINAL CAVITY: ICD-10-CM

## 2021-12-30 PROCEDURE — 74011250636 HC RX REV CODE- 250/636: Performed by: SURGERY

## 2021-12-30 PROCEDURE — 78227 HEPATOBIL SYST IMAGE W/DRUG: CPT

## 2021-12-30 PROCEDURE — 74011000250 HC RX REV CODE- 250: Performed by: SURGERY

## 2021-12-30 RX ORDER — KIT FOR THE PREPARATION OF TECHNETIUM TC 99M MEBROFENIN 45 MG/10ML
6.9 INJECTION, POWDER, LYOPHILIZED, FOR SOLUTION INTRAVENOUS
Status: COMPLETED | OUTPATIENT
Start: 2021-12-30 | End: 2021-12-30

## 2021-12-30 RX ORDER — WATER FOR INJECTION,STERILE
VIAL (ML) INJECTION
Status: COMPLETED | OUTPATIENT
Start: 2021-12-30 | End: 2021-12-30

## 2021-12-30 RX ADMIN — SINCALIDE 2.13 MCG: 5 INJECTION, POWDER, LYOPHILIZED, FOR SOLUTION INTRAVENOUS at 08:15

## 2021-12-30 RX ADMIN — KIT FOR THE PREPARATION OF TECHNETIUM TC 99M MEBROFENIN 6.9 MILLICURIE: 45 INJECTION, POWDER, LYOPHILIZED, FOR SOLUTION INTRAVENOUS at 07:45

## 2021-12-30 RX ADMIN — WATER 5 ML: 1 INJECTION INTRAMUSCULAR; INTRAVENOUS; SUBCUTANEOUS at 08:15

## 2022-04-13 ENCOUNTER — HOSPITAL ENCOUNTER (OUTPATIENT)
Dept: PREADMISSION TESTING | Age: 67
Discharge: HOME | End: 2022-04-13

## 2022-04-13 ENCOUNTER — HOSPITAL ENCOUNTER (OUTPATIENT)
Dept: PREADMISSION TESTING | Age: 67
Discharge: HOME | End: 2022-04-13
Payer: MEDICARE

## 2022-04-13 VITALS — HEIGHT: 67 IN | WEIGHT: 244 LBS | BODY MASS INDEX: 38.3 KG/M2

## 2022-04-13 LAB
ALBUMIN SERPL-MCNC: 3.6 G/DL (ref 3.4–5)
ALBUMIN/GLOB SERPL: 1.2 {RATIO} (ref 0.8–1.7)
ALP SERPL-CCNC: 84 U/L (ref 45–117)
ALT SERPL-CCNC: 24 U/L (ref 13–56)
ANION GAP SERPL CALC-SCNC: 4 MMOL/L (ref 3–18)
AST SERPL-CCNC: 17 U/L (ref 10–38)
BASOPHILS # BLD: 0.1 K/UL (ref 0–0.1)
BASOPHILS NFR BLD: 1 % (ref 0–2)
BILIRUB SERPL-MCNC: 0.3 MG/DL (ref 0.2–1)
BUN SERPL-MCNC: 21 MG/DL (ref 7–18)
BUN/CREAT SERPL: 16 (ref 12–20)
CALCIUM SERPL-MCNC: 9.5 MG/DL (ref 8.5–10.1)
CHLORIDE SERPL-SCNC: 110 MMOL/L (ref 100–111)
CO2 SERPL-SCNC: 31 MMOL/L (ref 21–32)
CREAT SERPL-MCNC: 1.33 MG/DL (ref 0.6–1.3)
DIFFERENTIAL METHOD BLD: NORMAL
EOSINOPHIL # BLD: 0.1 K/UL (ref 0–0.4)
EOSINOPHIL NFR BLD: 1 % (ref 0–5)
ERYTHROCYTE [DISTWIDTH] IN BLOOD BY AUTOMATED COUNT: 12.3 % (ref 11.6–14.5)
GLOBULIN SER CALC-MCNC: 3 G/DL (ref 2–4)
GLUCOSE SERPL-MCNC: 134 MG/DL (ref 74–99)
HCT VFR BLD AUTO: 41.8 % (ref 35–45)
HGB BLD-MCNC: 13.8 G/DL (ref 12–16)
IMM GRANULOCYTES # BLD AUTO: 0 K/UL (ref 0–0.04)
IMM GRANULOCYTES NFR BLD AUTO: 0 % (ref 0–0.5)
LYMPHOCYTES # BLD: 1.5 K/UL (ref 0.9–3.6)
LYMPHOCYTES NFR BLD: 27 % (ref 21–52)
MCH RBC QN AUTO: 30.1 PG (ref 24–34)
MCHC RBC AUTO-ENTMCNC: 33 G/DL (ref 31–37)
MCV RBC AUTO: 91.1 FL (ref 78–100)
MONOCYTES # BLD: 0.5 K/UL (ref 0.05–1.2)
MONOCYTES NFR BLD: 9 % (ref 3–10)
NEUTS SEG # BLD: 3.4 K/UL (ref 1.8–8)
NEUTS SEG NFR BLD: 62 % (ref 40–73)
NRBC # BLD: 0 K/UL (ref 0–0.01)
NRBC BLD-RTO: 0 PER 100 WBC
PLATELET # BLD AUTO: 283 K/UL (ref 135–420)
PMV BLD AUTO: 10.2 FL (ref 9.2–11.8)
POTASSIUM SERPL-SCNC: 3.5 MMOL/L (ref 3.5–5.5)
PROT SERPL-MCNC: 6.6 G/DL (ref 6.4–8.2)
RBC # BLD AUTO: 4.59 M/UL (ref 4.2–5.3)
SODIUM SERPL-SCNC: 145 MMOL/L (ref 136–145)
WBC # BLD AUTO: 5.4 K/UL (ref 4.6–13.2)

## 2022-04-13 PROCEDURE — 85025 COMPLETE CBC W/AUTO DIFF WBC: CPT

## 2022-04-13 PROCEDURE — 80053 COMPREHEN METABOLIC PANEL: CPT

## 2022-04-13 PROCEDURE — 36415 COLL VENOUS BLD VENIPUNCTURE: CPT

## 2022-04-13 PROCEDURE — 93005 ELECTROCARDIOGRAM TRACING: CPT

## 2022-04-13 RX ORDER — ACETAMINOPHEN 500 MG
5000 TABLET ORAL DAILY
COMMUNITY

## 2022-04-13 RX ORDER — CEFAZOLIN SODIUM/WATER 2 G/20 ML
2 SYRINGE (ML) INTRAVENOUS ONCE
Status: CANCELLED | OUTPATIENT
Start: 2022-04-13 | End: 2022-04-13

## 2022-04-13 RX ORDER — SODIUM CHLORIDE, SODIUM LACTATE, POTASSIUM CHLORIDE, CALCIUM CHLORIDE 600; 310; 30; 20 MG/100ML; MG/100ML; MG/100ML; MG/100ML
125 INJECTION, SOLUTION INTRAVENOUS CONTINUOUS
Status: CANCELLED | OUTPATIENT
Start: 2022-04-13

## 2022-04-13 RX ORDER — DEXLANSOPRAZOLE 60 MG/1
60 CAPSULE, DELAYED RELEASE ORAL
COMMUNITY

## 2022-04-13 NOTE — PERIOP NOTES
PAT - SURGICAL PRE-ADMISSION INSTRUCTIONS    NAME:  Ciera Lam                                                          TODAY'S DATE:  4/13/2022    SURGERY DATE:  4/15/2022                                  SURGERY ARRIVAL TIME:   tba    1. Do NOT eat or drink anything, including candy or gum, after MIDNIGHT on 4-15 , unless you have specific instructions from your Surgeon or Anesthesia Provider to do so. 2. No smoking 24 hours before surgery. 3. No alcohol 24 hours prior to the day of surgery. 4. No recreational drugs for one week prior to the day of surgery. 5. Leave all valuables, including money/purse, at home. 6. Remove all jewelry, nail polish, makeup (including mascara); no lotions, powders, deodorant, or perfume/cologne/after shave. 7. Glasses/Contact lenses and Dentures may be worn to the hospital.  They will be removed prior to surgery. 8. Call your doctor if symptoms of a cold or illness develop within 24 ours prior to surgery. 9. AN ADULT MUST DRIVE YOU HOME AFTER OUTPATIENT SURGERY. 10. If you are having an OUTPATIENT procedure, please make arrangements for a responsible adult to be with you for 24 hours after your surgery. 11. If you are admitted to the hospital, you will be assigned to a bed after surgery is complete. Normally a family member will not be able to see you until you are in your assigned bed. 12. Visitation Restrictions Explained.     Special Instructions:  Covid Test not needed, covid vaccine card in media, Take these medications the morning of surgery with a sip of water:  Dexillant, simvastatin    Patient Prep:    use CHG solution     These surgical instructions were reviewed with pt during the PAT phone call     Pt will come in today for labs and EKG

## 2022-04-14 ENCOUNTER — ANESTHESIA EVENT (OUTPATIENT)
Dept: SURGERY | Age: 67
End: 2022-04-14
Payer: MEDICARE

## 2022-04-14 LAB
ATRIAL RATE: 75 BPM
CALCULATED P AXIS, ECG09: 54 DEGREES
CALCULATED R AXIS, ECG10: 39 DEGREES
CALCULATED T AXIS, ECG11: 34 DEGREES
DIAGNOSIS, 93000: NORMAL
P-R INTERVAL, ECG05: 184 MS
Q-T INTERVAL, ECG07: 392 MS
QRS DURATION, ECG06: 88 MS
QTC CALCULATION (BEZET), ECG08: 437 MS
VENTRICULAR RATE, ECG03: 75 BPM

## 2022-04-14 NOTE — ANESTHESIA PREPROCEDURE EVALUATION
Relevant Problems   CARDIOVASCULAR   (+) Essential hypertension      GASTROINTESTINAL   (+) GERD (gastroesophageal reflux disease)      ENDOCRINE   (+) Arthritis       Anesthetic History        Pertinent negatives: No PONV       Review of Systems / Medical History  Patient summary reviewed, nursing notes reviewed and pertinent labs reviewed    Pulmonary              Pertinent negatives: No asthma, sleep apnea and smoker     Neuro/Psych         Psychiatric history (anxiety)  Pertinent negatives: No seizures and CVA   Cardiovascular    Hypertension          Hyperlipidemia  Pertinent negatives: No past MI  Exercise tolerance: >4 METS  Comments: Nuclear stress test 1/15/21:The EKG stress is negative for ischemia. · LV perfusion is probably normal.     Echo 8/10/20: NORMAL LEFT VENTRICULAR CAVITY SIZE AND SYSTOLIC FUNCTION WITH AN EJECTION FRACTION OF 58%.    MILD LEFT VENTRICULAR HYPERTROPHY PRESENT.    NO WALL MOTION ABNORMALITIES.  INDETERMINATE DIASTOLIC FUNCTION.    DILATED RIGHT HEART SIZE WITH NORMAL SYSTOLIC FUNCTION.    NO HEMODYNAMICALLY SIGNIFICANT VALVULAR PATHOLOGY.    ESTIMATED RIGHT VENTRICULAR SYSTOLIC PRESSURE IS 26 MMHG. GI/Hepatic/Renal     GERD (poorly controlled even on meds.  currently having heartburn in pre-op): poorly controlled    Renal disease (Cr 1.33, CrCl 53): CRI       Endo/Other        Obesity, morbid obesity (h/o morbid obesity s/p gastric procedure) and arthritis     Other Findings   Comments: fibromyalgia         Physical Exam    Airway  Mallampati: II  TM Distance: 4 - 6 cm  Neck ROM: normal range of motion   Mouth opening: Normal     Cardiovascular    Rhythm: regular  Rate: normal         Dental    Dentition: Full upper dentures and Lower dentition intact     Pulmonary  Breath sounds clear to auscultation               Abdominal  GI exam deferred       Other Findings            Anesthetic Plan    ASA: 3  Anesthesia type: general          Induction: Intravenous  Anesthetic plan and risks discussed with: Patient      Plan GETA. RSI due to poorly controlled heartburn with current symptomatic heartburn in pre-op holding. K+ normal at 3.5. Pt understands and accepts associated risks and agrees with plan. All questions answered. Consent signed.

## 2022-04-15 ENCOUNTER — HOSPITAL ENCOUNTER (OUTPATIENT)
Age: 67
Setting detail: OUTPATIENT SURGERY
Discharge: HOME | End: 2022-04-15
Attending: SURGERY | Admitting: SURGERY
Payer: MEDICARE

## 2022-04-15 ENCOUNTER — ANESTHESIA (OUTPATIENT)
Dept: SURGERY | Age: 67
End: 2022-04-15
Payer: MEDICARE

## 2022-04-15 VITALS
HEART RATE: 77 BPM | TEMPERATURE: 96.7 F | BODY MASS INDEX: 37.2 KG/M2 | RESPIRATION RATE: 16 BRPM | WEIGHT: 237 LBS | DIASTOLIC BLOOD PRESSURE: 60 MMHG | SYSTOLIC BLOOD PRESSURE: 125 MMHG | HEIGHT: 67 IN | OXYGEN SATURATION: 100 %

## 2022-04-15 DIAGNOSIS — Z90.49 S/P LAPAROSCOPIC CHOLECYSTECTOMY: Primary | ICD-10-CM

## 2022-04-15 PROCEDURE — 77030008574 HC TBNG SUC IRR STRY -B: Performed by: SURGERY

## 2022-04-15 PROCEDURE — 88307 TISSUE EXAM BY PATHOLOGIST: CPT

## 2022-04-15 PROCEDURE — 2709999900 HC NON-CHARGEABLE SUPPLY: Performed by: SURGERY

## 2022-04-15 PROCEDURE — 77030012770 HC TRCR OPT FX AMR -B: Performed by: SURGERY

## 2022-04-15 PROCEDURE — 77030010030: Performed by: SURGERY

## 2022-04-15 PROCEDURE — 77030006643: Performed by: ANESTHESIOLOGY

## 2022-04-15 PROCEDURE — 74011250636 HC RX REV CODE- 250/636: Performed by: ANESTHESIOLOGY

## 2022-04-15 PROCEDURE — 88304 TISSUE EXAM BY PATHOLOGIST: CPT

## 2022-04-15 PROCEDURE — 77030009403 HC ELECTRD ENDO MEGA -B: Performed by: SURGERY

## 2022-04-15 PROCEDURE — 74011000258 HC RX REV CODE- 258: Performed by: ANESTHESIOLOGY

## 2022-04-15 PROCEDURE — 77030002933 HC SUT MCRYL J&J -A: Performed by: SURGERY

## 2022-04-15 PROCEDURE — 77030020829: Performed by: SURGERY

## 2022-04-15 PROCEDURE — 74011250637 HC RX REV CODE- 250/637: Performed by: ANESTHESIOLOGY

## 2022-04-15 PROCEDURE — 76010000149 HC OR TIME 1 TO 1.5 HR: Performed by: SURGERY

## 2022-04-15 PROCEDURE — 76210000016 HC OR PH I REC 1 TO 1.5 HR: Performed by: SURGERY

## 2022-04-15 PROCEDURE — 77030018875 HC APPL CLP LIG4 J&J -B: Performed by: SURGERY

## 2022-04-15 PROCEDURE — 88313 SPECIAL STAINS GROUP 2: CPT

## 2022-04-15 PROCEDURE — 77030018836 HC SOL IRR NACL ICUM -A: Performed by: SURGERY

## 2022-04-15 PROCEDURE — 74011250636 HC RX REV CODE- 250/636: Performed by: SURGERY

## 2022-04-15 PROCEDURE — 77030031139 HC SUT VCRL2 J&J -A: Performed by: SURGERY

## 2022-04-15 PROCEDURE — 76060000033 HC ANESTHESIA 1 TO 1.5 HR: Performed by: SURGERY

## 2022-04-15 PROCEDURE — 77030008608 HC TRCR ENDOSC SMTH AMR -B: Performed by: SURGERY

## 2022-04-15 PROCEDURE — 74011000250 HC RX REV CODE- 250: Performed by: ANESTHESIOLOGY

## 2022-04-15 PROCEDURE — 77030020782 HC GWN BAIR PAWS FLX 3M -B: Performed by: SURGERY

## 2022-04-15 PROCEDURE — 77030008683 HC TU ET CUF COVD -A: Performed by: ANESTHESIOLOGY

## 2022-04-15 PROCEDURE — 77030010507 HC ADH SKN DERMBND J&J -B: Performed by: SURGERY

## 2022-04-15 PROCEDURE — 76210000021 HC REC RM PH II 0.5 TO 1 HR: Performed by: SURGERY

## 2022-04-15 PROCEDURE — 74011000250 HC RX REV CODE- 250: Performed by: SURGERY

## 2022-04-15 PROCEDURE — 77030003578 HC NDL INSUF VERES AMR -B: Performed by: SURGERY

## 2022-04-15 PROCEDURE — 77030008477 HC STYL SATN SLP COVD -A: Performed by: ANESTHESIOLOGY

## 2022-04-15 RX ORDER — ROCURONIUM BROMIDE 10 MG/ML
INJECTION, SOLUTION INTRAVENOUS AS NEEDED
Status: DISCONTINUED | OUTPATIENT
Start: 2022-04-15 | End: 2022-04-15 | Stop reason: HOSPADM

## 2022-04-15 RX ORDER — FLUMAZENIL 0.1 MG/ML
0.2 INJECTION INTRAVENOUS
Status: DISCONTINUED | OUTPATIENT
Start: 2022-04-15 | End: 2022-04-15 | Stop reason: HOSPADM

## 2022-04-15 RX ORDER — DEXAMETHASONE SODIUM PHOSPHATE 4 MG/ML
INJECTION, SOLUTION INTRA-ARTICULAR; INTRALESIONAL; INTRAMUSCULAR; INTRAVENOUS; SOFT TISSUE AS NEEDED
Status: DISCONTINUED | OUTPATIENT
Start: 2022-04-15 | End: 2022-04-15 | Stop reason: HOSPADM

## 2022-04-15 RX ORDER — CEFAZOLIN SODIUM/WATER 2 G/20 ML
2 SYRINGE (ML) INTRAVENOUS ONCE
Status: COMPLETED | OUTPATIENT
Start: 2022-04-15 | End: 2022-04-15

## 2022-04-15 RX ORDER — SODIUM CHLORIDE 0.9 % (FLUSH) 0.9 %
5-40 SYRINGE (ML) INJECTION AS NEEDED
Status: DISCONTINUED | OUTPATIENT
Start: 2022-04-15 | End: 2022-04-15 | Stop reason: HOSPADM

## 2022-04-15 RX ORDER — FENTANYL CITRATE 50 UG/ML
50 INJECTION, SOLUTION INTRAMUSCULAR; INTRAVENOUS AS NEEDED
Status: DISCONTINUED | OUTPATIENT
Start: 2022-04-15 | End: 2022-04-15 | Stop reason: HOSPADM

## 2022-04-15 RX ORDER — PROPOFOL 10 MG/ML
INJECTION, EMULSION INTRAVENOUS AS NEEDED
Status: DISCONTINUED | OUTPATIENT
Start: 2022-04-15 | End: 2022-04-15 | Stop reason: HOSPADM

## 2022-04-15 RX ORDER — EPHEDRINE SULFATE/0.9% NACL/PF 50 MG/5 ML
SYRINGE (ML) INTRAVENOUS AS NEEDED
Status: DISCONTINUED | OUTPATIENT
Start: 2022-04-15 | End: 2022-04-15 | Stop reason: HOSPADM

## 2022-04-15 RX ORDER — MIDAZOLAM HYDROCHLORIDE 1 MG/ML
INJECTION, SOLUTION INTRAMUSCULAR; INTRAVENOUS AS NEEDED
Status: DISCONTINUED | OUTPATIENT
Start: 2022-04-15 | End: 2022-04-15 | Stop reason: HOSPADM

## 2022-04-15 RX ORDER — OXYCODONE HYDROCHLORIDE 5 MG/1
5 TABLET ORAL
Qty: 18 TABLET | Refills: 0 | Status: SHIPPED | OUTPATIENT
Start: 2022-04-15 | End: 2022-04-18

## 2022-04-15 RX ORDER — PHENYLEPHRINE HCL IN 0.9% NACL 1 MG/10 ML
SYRINGE (ML) INTRAVENOUS AS NEEDED
Status: DISCONTINUED | OUTPATIENT
Start: 2022-04-15 | End: 2022-04-15 | Stop reason: HOSPADM

## 2022-04-15 RX ORDER — ONDANSETRON 8 MG/1
8 TABLET, ORALLY DISINTEGRATING ORAL
Qty: 12 TABLET | Refills: 0 | Status: SHIPPED | OUTPATIENT
Start: 2022-04-15

## 2022-04-15 RX ORDER — SODIUM CHLORIDE, SODIUM LACTATE, POTASSIUM CHLORIDE, CALCIUM CHLORIDE 600; 310; 30; 20 MG/100ML; MG/100ML; MG/100ML; MG/100ML
125 INJECTION, SOLUTION INTRAVENOUS CONTINUOUS
Status: DISCONTINUED | OUTPATIENT
Start: 2022-04-15 | End: 2022-04-15 | Stop reason: HOSPADM

## 2022-04-15 RX ORDER — FENTANYL CITRATE 50 UG/ML
INJECTION, SOLUTION INTRAMUSCULAR; INTRAVENOUS AS NEEDED
Status: DISCONTINUED | OUTPATIENT
Start: 2022-04-15 | End: 2022-04-15 | Stop reason: HOSPADM

## 2022-04-15 RX ORDER — HYDROMORPHONE HYDROCHLORIDE 1 MG/ML
0.5 INJECTION, SOLUTION INTRAMUSCULAR; INTRAVENOUS; SUBCUTANEOUS
Status: DISCONTINUED | OUTPATIENT
Start: 2022-04-15 | End: 2022-04-15 | Stop reason: HOSPADM

## 2022-04-15 RX ORDER — PHENYLEPHRINE HCL IN 0.9% NACL 1 MG/10 ML
SYRINGE (ML) INTRAVENOUS AS NEEDED
Status: DISCONTINUED | OUTPATIENT
Start: 2022-04-15 | End: 2022-04-15

## 2022-04-15 RX ORDER — SODIUM CHLORIDE 0.9 % (FLUSH) 0.9 %
5-40 SYRINGE (ML) INJECTION EVERY 8 HOURS
Status: DISCONTINUED | OUTPATIENT
Start: 2022-04-15 | End: 2022-04-15 | Stop reason: HOSPADM

## 2022-04-15 RX ORDER — NALOXONE HYDROCHLORIDE 0.4 MG/ML
0.4 INJECTION, SOLUTION INTRAMUSCULAR; INTRAVENOUS; SUBCUTANEOUS AS NEEDED
Status: DISCONTINUED | OUTPATIENT
Start: 2022-04-15 | End: 2022-04-15 | Stop reason: HOSPADM

## 2022-04-15 RX ORDER — ONDANSETRON 2 MG/ML
INJECTION INTRAMUSCULAR; INTRAVENOUS AS NEEDED
Status: DISCONTINUED | OUTPATIENT
Start: 2022-04-15 | End: 2022-04-15 | Stop reason: HOSPADM

## 2022-04-15 RX ORDER — LIDOCAINE HYDROCHLORIDE 20 MG/ML
INJECTION, SOLUTION EPIDURAL; INFILTRATION; INTRACAUDAL; PERINEURAL AS NEEDED
Status: DISCONTINUED | OUTPATIENT
Start: 2022-04-15 | End: 2022-04-15 | Stop reason: HOSPADM

## 2022-04-15 RX ORDER — ACETAMINOPHEN 500 MG
1000 TABLET ORAL ONCE
Status: COMPLETED | OUTPATIENT
Start: 2022-04-15 | End: 2022-04-15

## 2022-04-15 RX ORDER — OXYCODONE HYDROCHLORIDE 5 MG/1
5 TABLET ORAL
Status: DISCONTINUED | OUTPATIENT
Start: 2022-04-15 | End: 2022-04-15 | Stop reason: HOSPADM

## 2022-04-15 RX ORDER — SUCCINYLCHOLINE CHLORIDE 100 MG/5ML
SYRINGE (ML) INTRAVENOUS AS NEEDED
Status: DISCONTINUED | OUTPATIENT
Start: 2022-04-15 | End: 2022-04-15 | Stop reason: HOSPADM

## 2022-04-15 RX ADMIN — ROCURONIUM BROMIDE 5 MG: 10 INJECTION, SOLUTION INTRAVENOUS at 11:22

## 2022-04-15 RX ADMIN — FENTANYL CITRATE 50 MCG: 50 INJECTION, SOLUTION INTRAMUSCULAR; INTRAVENOUS at 13:01

## 2022-04-15 RX ADMIN — Medication 100 MG: at 11:25

## 2022-04-15 RX ADMIN — FENTANYL CITRATE 50 MCG: 50 INJECTION, SOLUTION INTRAMUSCULAR; INTRAVENOUS at 11:23

## 2022-04-15 RX ADMIN — ONDANSETRON HYDROCHLORIDE 4 MG: 2 INJECTION INTRAMUSCULAR; INTRAVENOUS at 12:08

## 2022-04-15 RX ADMIN — Medication 2 G: at 11:31

## 2022-04-15 RX ADMIN — DEXAMETHASONE SODIUM PHOSPHATE 8 MG: 4 INJECTION, SOLUTION INTRAMUSCULAR; INTRAVENOUS at 11:32

## 2022-04-15 RX ADMIN — SODIUM CHLORIDE, SODIUM LACTATE, POTASSIUM CHLORIDE, AND CALCIUM CHLORIDE 125 ML/HR: 600; 310; 30; 20 INJECTION, SOLUTION INTRAVENOUS at 08:10

## 2022-04-15 RX ADMIN — Medication 100 MCG: at 11:32

## 2022-04-15 RX ADMIN — SUGAMMADEX 430 MG: 100 INJECTION, SOLUTION INTRAVENOUS at 11:59

## 2022-04-15 RX ADMIN — FENTANYL CITRATE 50 MCG: 50 INJECTION, SOLUTION INTRAMUSCULAR; INTRAVENOUS at 12:44

## 2022-04-15 RX ADMIN — Medication 10 MG: at 11:30

## 2022-04-15 RX ADMIN — FENTANYL CITRATE 50 MCG: 50 INJECTION, SOLUTION INTRAMUSCULAR; INTRAVENOUS at 11:42

## 2022-04-15 RX ADMIN — ROCURONIUM BROMIDE 35 MG: 10 INJECTION, SOLUTION INTRAVENOUS at 11:32

## 2022-04-15 RX ADMIN — ACETAMINOPHEN 1000 MG: 500 TABLET ORAL at 08:10

## 2022-04-15 RX ADMIN — MIDAZOLAM 2 MG: 1 INJECTION INTRAMUSCULAR; INTRAVENOUS at 11:15

## 2022-04-15 RX ADMIN — Medication 10 MG: at 11:29

## 2022-04-15 RX ADMIN — PROPOFOL 200 MG: 10 INJECTION, EMULSION INTRAVENOUS at 11:25

## 2022-04-15 RX ADMIN — LIDOCAINE HYDROCHLORIDE 100 MG: 20 INJECTION, SOLUTION INTRAVENOUS at 11:25

## 2022-04-15 RX ADMIN — PROMETHAZINE HYDROCHLORIDE 6.25 MG: 25 INJECTION INTRAMUSCULAR; INTRAVENOUS at 13:02

## 2022-04-15 NOTE — DISCHARGE INSTRUCTIONS
Patient Education        Laparoscopy: What to Expect at Home  Your Recovery  After laparoscopic surgery, you are likely to have pain for the next several days. You may have a low fever and feel tired and sick to your stomach. This is common. You should feel better after 1 to 2 weeks. This care sheet gives you a general idea about how long it will take for you to recover. But each person recovers at a different pace. Follow the steps below to get better as quickly as possible. How can you care for yourself at home? Activity    · Rest when you feel tired. Getting enough sleep will help you recover.     · Try to walk each day. Start by walking a little more than you did the day before. Bit by bit, increase the amount you walk. Walking boosts blood flow and helps prevent pneumonia and constipation.     · Avoid strenuous activities, such as bicycle riding, jogging, weight lifting, or aerobic exercise, until your doctor says it is okay.     · Avoid lifting anything that would make you strain. This may include a child, heavy grocery bags and milk containers, a heavy briefcase or backpack, cat litter or dog food bags, or a vacuum .     · You may also have some shoulder or back pain. This pain is caused by the gas your doctor used to inflate your belly to help see your organs better. The pain usually lasts about 1 or 2 days.     · You may drive when you are no longer taking pain medicine and can quickly move your foot from the gas pedal to the brake. You must also be able to sit comfortably for a long period of time, even if you do not plan to go far. You might get caught in traffic.     · You may need to take a few days to a few weeks off work. It depends on the type of work you do and how you feel.     · You may shower 24 to 48 hours after surgery, if your doctor okays it. Pat the cut (incision) dry. Do not take a bath for the first 2 weeks, or until your doctor tells you it is okay.    Diet    · If your stomach is upset, try bland, low-fat foods such as plain rice, broiled chicken, toast, and yogurt.     · Drink plenty of fluids to prevent dehydration. Choose water and other clear liquids. If you have kidney, heart, or liver disease and have to limit fluids, talk with your doctor before you increase the amount of fluids you drink.     · You may notice that your bowel movements are not regular right after your surgery. This is common. Avoid constipation and straining with bowel movements. You may want to take a fiber supplement every day. If you have not had a bowel movement after a couple of days, ask your doctor about taking a mild laxative. Medicines    · Your doctor will tell you if and when you can restart your medicines. You will also get instructions about taking any new medicines.     · If you take aspirin or some other blood thinner, ask your doctor if and when to start taking it again. Make sure that you understand exactly what your doctor wants you to do.     · Take pain medicines exactly as directed. ? If the doctor gave you a prescription medicine for pain, take it as prescribed. ? If you are not taking a prescription pain medicine, ask your doctor if you can take an over-the-counter medicine.     · If your doctor prescribed antibiotics, take them as directed. Do not stop taking them just because you feel better. You need to take the full course of antibiotics.     · If you think your pain medicine is making you sick to your stomach:  ? Take your medicine after meals (unless your doctor has told you not to). ? Ask your doctor for a different pain medicine. Incision care    · If you have strips of tape on the incision, leave the tape on for a week or until it falls off.     · Wash the area daily with warm, soapy water and pat it dry. Don't use hydrogen peroxide or alcohol, which can slow healing. You may cover the area with a gauze bandage if it weeps or rubs against clothing.  Change the bandage every day.   Follow-up care is a key part of your treatment and safety. Be sure to make and go to all appointments, and call your doctor if you are having problems. It's also a good idea to know your test results and keep a list of the medicines you take. When should you call for help? Call 911 anytime you think you may need emergency care. For example, call if:    · You passed out (lost consciousness).     · You are short of breath. Call your doctor now or seek immediate medical care if:    · You have pain that does not get better after you take pain medicine.     · You have loose stitches, or your incision comes open.     · Bright red blood has soaked through your bandage.     · You have signs of infection, such as:  ? Increased pain, swelling, warmth, or redness. ? Red streaks leading from the incision. ? Pus draining from the incision. ? A fever.     · You are sick to your stomach or cannot keep fluids down.     · You have signs of a blood clot in your leg (called a deep vein thrombosis), such as:  ? Pain in your calf, back of the knee, thigh, or groin. ? Redness and swelling in your leg or groin.     · You cannot pass stools or gas. Watch closely for any changes in your health, and be sure to contact your doctor if you have any problems. Where can you learn more? Go to http://www.gray.com/  Enter G657 in the search box to learn more about \"Laparoscopy: What to Expect at Home. \"  Current as of: September 8, 2021               Content Version: 13.2  © 2006-2022 [employer]. Care instructions adapted under license by [employer] (which disclaims liability or warranty for this information). If you have questions about a medical condition or this instruction, always ask your healthcare professional. [place] [address] any warranty or liability for your use of this information.          DISCHARGE SUMMARY from Nurse    PATIENT INSTRUCTIONS:    After general anesthesia or intravenous sedation, for 24 hours or while taking prescription Narcotics:  · Limit your activities  · Do not drive and operate hazardous machinery  · Do not make important personal or business decisions  · Do  not drink alcoholic beverages  · If you have not urinated within 8 hours after discharge, please contact your surgeon on call. Report the following to your surgeon:  · Excessive pain, swelling, redness or odor of or around the surgical area  · Temperature over 100.5  · Nausea and vomiting lasting longer than 4 hours or if unable to take medications  · Any signs of decreased circulation or nerve impairment to extremity: change in color, persistent  numbness, tingling, coldness or increase pain  · Any questions    What to do at Home:  LIGHT DIET TODAY ADVANCE AS TOLERATED  OK TO SHOWER IN 50 HRS  DO NOT SCRUB INCISIONS WHEN SHOWERING  RETURN TO OFFICE IN 3 TO 4 WEEKS CALL FOR APPT    If you experience any of the following symptoms heavy bleeding, fevers, severe pain, please follow up with dr ortiz    *  Please give a list of your current medications to your Primary Care Provider. *  Please update this list whenever your medications are discontinued, doses are      changed, or new medications (including over-the-counter products) are added. *  Please carry medication information at all times in case of emergency situations. These are general instructions for a healthy lifestyle:    No smoking/ No tobacco products/ Avoid exposure to second hand smoke  Surgeon General's Warning:  Quitting smoking now greatly reduces serious risk to your health.     Obesity, smoking, and sedentary lifestyle greatly increases your risk for illness    A healthy diet, regular physical exercise & weight monitoring are important for maintaining a healthy lifestyle    You may be retaining fluid if you have a history of heart failure or if you experience any of the following symptoms: Weight gain of 3 pounds or more overnight or 5 pounds in a week, increased swelling in our hands or feet or shortness of breath while lying flat in bed. Please call your doctor as soon as you notice any of these symptoms; do not wait until your next office visit. The discharge information has been reviewed with the patient and caregiver. The patient and caregiver verbalized understanding. Discharge medications reviewed with the patient and caregiver and appropriate educational materials and side effects teaching were provided.   ___________________________________________________________________________________________________________________________________    Patient armband removed and shredded

## 2022-04-15 NOTE — BRIEF OP NOTE
Brief Postoperative Note    Patient: Stevan Menjivar  YOB: 1955  MRN: [id number]    Date of Procedure: 4/15/2022     Pre-Op Diagnosis: POSTPRANDIAL EPIGASTRIC PAIN, ABNORMAL HIDA SCAN, ABNORMAL LIVER US    Post-Op Diagnosis: Same as preoperative diagnosis.       Procedure(s):  LAPAROSCOPIC CHOLECYSTECTOMY, LIVER WEDGE BIOPSY    Surgeon(s):  Wilbert Valadez MD    Surgical Assistant: Surg Asst-1: Faith Chung    Anesthesia: General     Estimated Blood Loss (mL): Minimal    Complications: None    Specimens:   ID Type Source Tests Collected by Time Destination   1 : GALLBLADDER AND CONTENTS Preservative Gallbladder  Wilbert Valadez MD 4/15/2022 1150 Pathology   2 : LIVER WEDGE BIOPSY Preservative Liver  Wilbert Valadez MD 4/15/2022 1150 Pathology        Implants: * No implants in log *    Drains: * No LDAs found *    Findings: SEE OP NOTE    Electronically Signed by Lida Avery MD on 4/15/2022 at 12:35 PM    Op note dictated #[id number]  RP

## 2022-04-15 NOTE — ANESTHESIA POSTPROCEDURE EVALUATION
Post-Anesthesia Evaluation and Assessment    Cardiovascular Function/Vital Signs  Visit Vitals  /60   Pulse 77   Temp (!) 35.9 °C (96.7 °F)   Resp 16   Ht 5' 7\" (1.702 m)   Wt 107.5 kg (237 lb)   SpO2 100%   BMI 37.12 kg/m²       Patient is status post Procedure(s):  LAPAROSCOPIC CHOLECYSTECTOMY,LIVER WEDGE BIOPSY. Nausea/Vomiting: Controlled. Postoperative hydration reviewed and adequate. Pain:  Pain Scale 1: Visual (04/15/22 1420)  Pain Intensity 1: 2 (04/15/22 1420)   Managed. Neurological Status:   Neuro (WDL): Within Defined Limits (04/15/22 1354)   At baseline. Mental Status and Level of Consciousness: Arousable. Pulmonary Status:   O2 Device: None (Room air) (04/15/22 1354)   Adequate oxygenation and airway patent. Complications related to anesthesia: None    Post-anesthesia assessment completed. No concerns. Patient has met all discharge requirements.     Signed By: Bobbi Jalloh MD    April 15, 2022

## 2022-04-15 NOTE — OP NOTES
[address] REPORT    Name:  Jennifer Chávez  MR#:   [id number]  :  1955  ACCOUNT #:  [de-identified]  DATE OF SERVICE:  04/15/2022    GENERAL SURGERY AMBULATORY OPERATIVE NOTE    LOCATION:  [facility]    PREOPERATIVE DIAGNOSES:  1. Postprandial epigastric pain. 2.  Abnormal biliary HIDA scan. 3.  Abnormal liver ultrasound. POSTOPERATIVE DIAGNOSES:  1. Postprandial epigastric pain. 2.  Abnormal biliary HIDA scan. 3.  Abnormal liver ultrasound. PROCEDURE PERFORMED:  1. Laparoscopic cholecystectomy. 2.  Liver wedge biopsy. SURGEON:  Matilda Ortiz MD    ASSISTANT:  None. ANESTHESIA:  General.    COMPLICATIONS:  None. SPECIMENS REMOVED:  1.  Gallbladder contents for permanent pathology. 2.  Liver wedge biopsy for permanent pathology. DRAINS/IMPLANTS:  None. ESTIMATED BLOOD LOSS:  Minimal.    NARRATIVE OPERATION:  The patient is a 78-year-old white female, seen and evaluated by me with a history of gastric sleeve, hiatal hernia and more recently worsening postprandial epigastric pain with a HIDA scan showing findings consistent with possible gallbladder hyperkinesia as well as reproduction of her typical pain on gallbladder stimulation. Additionally, she had liver ultrasound showing findings consistent with hepatic steatosis. As her symptoms were becoming increasingly frequent and severe, she did wish to proceed with surgery. Discussed with her the nature of surgery, alternatives, benefits and risks. She gave consent to proceed. PROCEDURE:  She was taken to the OR on 04/15/2022, met and identified in the preoperative holding area. She was then brought to the operating room. Preoperative intravenous antibiotics were given per protocol. She was positioned on the table and all pressure points appropriately padded. Sequential compression devices were applied. General anesthesia was administered with monitors and oxygen placed.   The area was prepped and draped in the usual sterile fashion. After surgical pause, we began the case by first locally anesthetizing the surgical incision site. Because of her previous periumbilical midline incision from before removal of sigmoid colectomy specimen as well as repair of hernia at that same site, I did do an open cutdown approach at the subxiphoid location, started with a 3-cm transverse incision here with 15-blade through skin and dermis, bluntly dissected down through the subcutaneous tissue to the anterior sheath. The anterior sheath was Bovie cauterized just to the right of midline exposing the underlying rectus muscle. The rectus muscle was retracted laterally to expose the posterior sheath and peritoneum. These were grasped between clamps, cut using Metzenbaum scissors. Peritoneal cavity was entered. Kaley trocar was inserted. Balloon was inflated. Pneumoperitoneum was established to a pressure of 15 mmHg. A 30-degree laparoscope was inserted. There was no evidence of a trocar-induced trauma to the underlying abdominal viscera. This site was then examined again from the umbilical incision. The trocar came through just to the right lateral side of falciform ligament. There was no evidence of trauma to the underlying abdominal viscera. After insertion of the 30-degree laparoscope, we directed our attention towards the umbilicus. There were no adhesions anywhere to the anterior abdominal wall, and there was no evidence of any recurrent hernia here. Therefore, a 5-mm vertical incision in the infraumbilical location along her previous surgical incision was made with 15-blade through skin, dermis and scar, and through this, a 5-mm bladeless trocar was inserted under laparoscopic vision. We then replaced the camera through the umbilical trocar and directed towards right upper quadrant. The patient was placed in the reverse Trendelenburg position and airplane right-side up.   The fundus of the gallbladder was grasped and retracted towards the anterior abdominal wall in a cephalad direction. The infundibulum was then grasped and retracted laterally and inferiorly, exposed the area of Calot's triangle. The peritoneum over Calot's triangle was taken down using blunt [place] dissection. Once Calot's triangle was cleared, the \"critical view\" was achieved including visualization of the gallbladder cystic duct junction. The cystic duct was clipped with two clips on the patient's side, one clip on the specimen's side, divided with Endo ashish. Cystic artery was identified in its usual position, dissected out, isolated, clipped and divided with two clips on the patient's side, one clip on the specimen's side, divided with Endo ashish. Gallbladder was then dissected free from the gallbladder fossa using L-hook cauterization. Prior to removing the gallbladder, we did a liver wedge biopsy for the above-mentioned reason. I did this by first scoring with cautery an adequate size specimen on the inferior edge to the liver just medial to the gallbladder fossa. The specimen was removed with a 10-mm spoon, the resulting defect was cauterized until hemostasis was satisfactory. At this point, the gallbladder was placed into an Endo Catch bag and removed through the subxiphoid incision, sent for permanent pathology. Gallbladder fossa was irrigated. Irrigation was evacuated as completely as possible. On final inspection, the gallbladder fossa was clean and dry with good hemostasis. No evidence of any biliary leakage. Cystic duct and cystic artery clips were all in good position with no  evidence of leakage from any of the sites. Subxiphoid trocar was removed. The fascial defect here was closed with figure-of-eight 0 Vicryl suture placed using sharp suture passer under laparoscopic vision. The patient was placed back in level position. Remaining upper abdominal trocars removed.   Hemostasis was satisfactory at these sites as well. Pneumoperitoneum was evacuated to the umbilical trocar before it too was removed. Hemostasis was satisfactory at this site as well. All skin incisions were irrigated. Subxiphoid incision had an additional fascial closure with figure-of-eight 0 Vicryl on the anterior sheath after closing peritoneum. Both of these were closed with 0 Vicryl suture. The skin at the subxiphoid location was closed in layers with interrupted 3-0 Vicryl dermal suture followed by running 4-0 Monocryl subcuticular suture to close the skin. All 5-mm trocar sites were closed with 4-0 Monocryl subcuticular suture. Incisions were then cleaned, dried, dressed with Dermabond. The patient tolerated the entire procedure without incident. She was extubated in the OR and taken to recovery room postop and in stable condition.       Reagan Salvador MD      [id number]  D:  04/15/2022 12:35  T:  04/15/2022 14:24  JOB #:  [id number]  CC:  Jessica Samuel MD

## 2022-04-15 NOTE — PERIOP NOTES
Reviewed PTA medication list with patient/caregiver and patient/caregiver denies any additional medications. Patient admits to having a responsible adult care for them at home for at least 24 hours after surgery. Patient encouraged to use gown warming system and informed that using said warming gown to regulate body temperature prior to a procedure has been shown to help reduce the risks of blood clots and infection. Patient's pharmacy of choice verified and documented in PTA medication section.     Dual skin assessment & fall risk band verification completed with Raúl Masters RN.

## 2022-04-15 NOTE — INTERVAL H&P NOTE
Update History & Physical    The Patient's History and Physical of April 15,   2022 was reviewed with the patient and I examined the patient. There was no change. The surgical site was confirmed by the patient and me. Plan:  The risk, benefits, expected outcome, and alternative to the recommended procedure have been discussed with the patient. Patient understands and wants to proceed with the procedure.     Electronically signed by Frank Maddox MD on 4/15/2022 at 10:56 AM

## 2022-10-05 ENCOUNTER — TRANSCRIBE ORDER (OUTPATIENT)
Dept: REGISTRATION | Age: 67
End: 2022-10-05

## 2022-10-05 ENCOUNTER — HOSPITAL ENCOUNTER (OUTPATIENT)
Dept: NON INVASIVE DIAGNOSTICS | Age: 67
Discharge: HOME | End: 2022-10-05
Payer: MEDICARE

## 2022-10-05 ENCOUNTER — HOSPITAL ENCOUNTER (OUTPATIENT)
Dept: LAB | Age: 67
Discharge: HOME | End: 2022-10-05

## 2022-10-05 DIAGNOSIS — Z01.818 OTHER SPECIFIED PRE-OPERATIVE EXAMINATION: Primary | ICD-10-CM

## 2022-10-05 DIAGNOSIS — Z01.818 OTHER SPECIFIED PRE-OPERATIVE EXAMINATION: ICD-10-CM

## 2022-10-05 LAB
ATRIAL RATE: 67 BPM
CALCULATED P AXIS, ECG09: 62 DEGREES
CALCULATED R AXIS, ECG10: 47 DEGREES
CALCULATED T AXIS, ECG11: 40 DEGREES
DIAGNOSIS, 93000: NORMAL
P-R INTERVAL, ECG05: 182 MS
Q-T INTERVAL, ECG07: 418 MS
QRS DURATION, ECG06: 88 MS
QTC CALCULATION (BEZET), ECG08: 441 MS
VENTRICULAR RATE, ECG03: 67 BPM
XX-LABCORP SPECIMEN COL,LCBCF: NORMAL

## 2022-10-05 PROCEDURE — 93005 ELECTROCARDIOGRAM TRACING: CPT

## 2022-10-05 PROCEDURE — 99001 SPECIMEN HANDLING PT-LAB: CPT

## 2024-05-04 NOTE — H&P
Assessment/Plan  # Detail Type Description    1. Assessment Abdominal pain (R10.9). 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 Pt has also had gastric sleeve. Will get CT A/P - Exam unclear if hernia contributing to pain. Will r/o hernia before making plan. .    Patient Plan  The patient verbalized an understanding of the plan. Plan Orders Further diagnostic evaluations ordered today include(s) CT Abdomen And Pelvis W/ DYE to be performed. 2. Other Orders Orders not associated to today's assessments. Plan Orders BUN AU to be performed and Creatinine, Serum AU to be performed. This 58year old female presents for follow up of C Scope. History of Present Illness:  1. Follow Up of C Scope             PROBLEM LIST:  Problem Description Onset Date   Microcalcifications of the breast 2014   Benign essential hypertension 10/23/2014       PAST MEDICAL/SURGICAL HISTORY  (Detailed)    Disease/disorder Onset Date Management Date Comments     Knee replacement       ganglion cyst removed       Hysterectomy, partial       Carpal tunnel release       Colonoscopy with possible biopsies and/or polypectomies as needed       Family History:  (Detailed)  Relationship Family Member Name  Age at Death Condition Onset Age Cause of Death       Family history of Cancer, prostate  N       Family history of Heart disease  N       Family history of Diabetes mellitus type 2  N       Social History:  (Detailed)  Tobacco use reviewed. Preferred language is [place]. MARITAL STATUS/FAMILY/SOCIAL SUPPORT  Currently . Tobacco use status: Never smoked tobacco.  Smoking status: Never smoker. SMOKING STATUS  Use Status Type Smoking Status Usage Per Day Years Used Total Pack Years   no/never  Never smoker          ALCOHOL  There is a history of alcohol use, but no current usage. Type: Wine.  consumed Patient had hypertensive urgency.    rarely. CAFFEINE  The patient uses caffeine: coffee - 2-3 cups a day. LIFESTYLE  DIET  , high cholesterol. Jainism/SPIRITUAL  The patient has None Catholic affiliation. Patient Status   Completed with information received for patient transitioning into care. Completed with information received for patient in a summary of care record. Medication Reconciliation  Medications reconciled today. Medication Reviewed  Adherence Medication Name Sig Desc Elsewhere Status   taking as directed Zyrtec 10 mg tablet take 1 tablet by oral route  every day N Verified   taking as directed SIMVASTATIN 10 MG TABLET TAKE 1 TABLET BY ORAL ROUTE EVERY DAY IN THE EVENING N Verified   taking as directed Fish Oil 120 mg-180 mg-60 mg-1,200 mg capsule,delayed release take 1 cap by mouth everyday N Verified   taking as directed amoxicillin 875 mg-potassium clavulanate 125 mg tablet take 1 tablet by oral route  every 12 hours N Verified   taking as directed OLMESARTAN-HCTZ 40-12.5 MG TAB TAKE 1 TABLET BY MOUTH EVERY DAY N Verified   taking as directed Vistaril 25 mg capsule TAKE ONE CAPSULE BY MOUTH 4 TIMES A DAY AS NEEDED N Verified   taking as directed ESOMEPRAZOLE MAG DR 40 MG CAP TAKE 1 CAPSULE BY ORAL ROUTE EVERY DAY N Verified   taking as directed sertraline 25 mg tablet TAKE 1 TABLET BY MOUTH EVERY DAY N Verified   taking as directed peg 3350 240 gram-electrolytes 22.72 gram-6.72 g-5.84 g powdr for soln as directed split prep instructions N Verified   taking as directed DULOXETINE HCL DR 60 MG CAP TAKE 1 CAPSULE BY MOUTH EVERY DAY*1/27/17 N Verified     Allergies:  Ingredient Reaction Medication Name Comment   BENZOIN Rash     LISINOPRIL chronic cough     (Reviewed, no changes.)  Review of Systems  System Neg/Pos Details   Constitutional Negative Fever, night sweats and weight loss. ENMT Negative Hearing loss, tinnitus, vertigo and voice change. Eyes Negative Diplopia and vision loss.    Respiratory Negative Asthma, cough, dyspnea, hemoptysis, known TB exposure and wheezing. Cardio Negative Chest pain, claudication, edema, irregular heartbeat/palpitations and thrombophlebitis. GI Negative Bloating, dysphagia, hemorrhoids, jaundice and reflux.  Negative Dysuria, nocturia, passage stone/gravel and urinary incontinence. Endocrine Negative Cold intolerance and goiter. Neuro Negative Focal weakness, headache, paresthesia, seizures and syncope. Integumentary Negative Change in shape/size of mole(s) and skin lesion. MS Negative Back pain, bone/joint symptoms and muscle weakness. Hema/Lymph Negative Easy bleeding and easy bruising. Allergic/Immuno Negative Contact allergy and contact dermatitis. Vital Signs     Height  Time ft in cm Last Measured Height Position   10:07 AM 5.0 7.00 170.18 05/10/2018 0     Weight/BSA/BMI  Time lb oz kg Context BMI kg/m2 BSA m2   10:07 .00  106. 141 dressed with shoes 36.65      Blood Pressure  Time BP mm/Hg Position Side Site Method Cuff Size   10:07 /78 sitting right arm automatic adult large     Temperature/Pulse/Respiration  Time Temp F Temp C Temp Site Pulse/min Pattern Resp/ min   10:07 AM 98.60 37.00 ear 70 regular      Measured By  Time Measured by   10:07 AM Frank Viera         Medications (added, continued, or stopped this visit):  Started Medication Directions PRN Status PRN Reason Instruction Stopped   02/11/2018 amoxicillin 875 mg-potassium clavulanate 125 mg tablet take 1 tablet by oral route  every 12 hours N      02/19/2018 DULOXETINE HCL DR 60 MG CAP TAKE 1 CAPSULE BY MOUTH EVERY DAY*1/27/17 N      01/12/2018 ESOMEPRAZOLE MAG DR 40 MG CAP TAKE 1 CAPSULE BY ORAL ROUTE EVERY DAY N      10/23/2014 Fish Oil 120 mg-180 mg-60 mg-1,200 mg capsule,delayed release take 1 cap by mouth everyday N      01/23/2018 OLMESARTAN-HCTZ 40-12.5 MG TAB TAKE 1 TABLET BY MOUTH EVERY DAY N      03/06/2018 peg 3350 240 gram-electrolytes 22.72 gram-6.72 g-5.84 g powdr for soln as directed split prep instructions N      03/15/2018 sertraline 25 mg tablet TAKE 1 TABLET BY MOUTH EVERY DAY N      01/12/2018 SIMVASTATIN 10 MG TABLET TAKE 1 TABLET BY ORAL ROUTE EVERY DAY IN THE EVENING N      01/23/2018 Vistaril 25 mg capsule TAKE ONE CAPSULE BY MOUTH 4 TIMES A DAY AS NEEDED N      10/23/2014 Zyrtec 10 mg tablet take 1 tablet by oral route  every day N      To Be Scheduled / Ordered:  Status Order Reason Assessment Timeframe Appointment   ordered BUN AU  I10     ordered Creatinine, Serum AU  I10     ordered CT Abdomen And Pelvis W/ DYE  R10.9

## (undated) DEVICE — BLUNT TIP LAPAROSCOPIC SEALER/DIVIDER: Brand: LIGASURE

## (undated) DEVICE — LAPAROSCOPIC TROCAR SLEEVE/SINGLE USE: Brand: KII® OPTICAL ACCESS SYSTEM

## (undated) DEVICE — CATHETERIZATION TRAY 16 FR FOL DRAINAGE BG LUBRI-SIL IC

## (undated) DEVICE — TOWEL SURG W16XL26IN BLU NONFENESTRATED DLX ST 2 PER PK

## (undated) DEVICE — GLOVE ORANGE PI 7 1/2   MSG9075

## (undated) DEVICE — SUT VCRL + 0 36IN UR6 VIO --

## (undated) DEVICE — 3-0 COATED VICRYL PLUS UNDYED 1X27" SH --

## (undated) DEVICE — STAPLER INT L75MM CUT LN L73MM STPL LN L77MM BLU B FRM 8

## (undated) DEVICE — E-Z CLEAN, PTFE COATED, ELECTROSURGICAL LAPAROSCOPIC ELECTRODE, L-HOOK, 33 CM., SINGLE-USE, FOR USE WITH HAND CONTROL PENCIL: Brand: MEGADYNE

## (undated) DEVICE — ENDOCUT SCISSOR TIP, DISPOSABLE: Brand: RENEW

## (undated) DEVICE — INSULATED BLADE ELECTRODE: Brand: EDGE

## (undated) DEVICE — SUTURE SZ 0 27IN 5/8 CIR UR-6  TAPER PT VIOLET ABSRB VICRYL J603H

## (undated) DEVICE — SHEET,DRAPE,40X58,STERILE: Brand: MEDLINE

## (undated) DEVICE — SOL IRRIGATION INJ NACL 0.9% 500ML BTL

## (undated) DEVICE — KENDALL SCD EXPRESS SLEEVES, KNEE LENGTH, MEDIUM: Brand: KENDALL SCD

## (undated) DEVICE — DISPOSABLE SUCTION/IRRIGATOR TUBE SET WITH TIP: Brand: AHTO

## (undated) DEVICE — STERILE POLYISOPRENE POWDER-FREE SURGICAL GLOVES: Brand: PROTEXIS

## (undated) DEVICE — GOWN,SIRUS,NONRNF,SETINSLV,XL,20/CS: Brand: MEDLINE

## (undated) DEVICE — SYRINGE MED 20ML STD CLR PLAS LUERLOCK TIP N CTRL DISP

## (undated) DEVICE — RELOAD STPL L75MM OPN H3.8MM CLS 1.5MM WIRE DIA0.2MM REG

## (undated) DEVICE — 3M™ TEGADERM™ TRANSPARENT FILM DRESSING FRAME STYLE, 1626W, 4 IN X 4-3/4 IN (10 CM X 12 CM), 50/CT 4CT/CASE: Brand: 3M™ TEGADERM™

## (undated) DEVICE — INSUFFLATION NEEDLE TO ESTABLISH PNEUMOPERITONEUM.: Brand: INSUFFLATION NEEDLE

## (undated) DEVICE — 3M(TM) TEGADERM(TM) TRANSPARENT FILM DRESSING FRAME STYLE 9505W: Brand: 3M™ TEGADERM™

## (undated) DEVICE — SYSTEM FLD MGMT DIA0.9MM 45DEG ULT BAL VISN ACT INTREPID

## (undated) DEVICE — DEVON™ KNEE AND BODY STRAP 60" X 3" (1.5 M X 7.6 CM): Brand: DEVON

## (undated) DEVICE — DRESSING FOAM SELF ADH 20X10 CM ABSORBENT MEPILEX BORDER

## (undated) DEVICE — NEEDLE HYPO 21GA L1.5IN INTRAMUSCULAR S STL LATCH BVL UP

## (undated) DEVICE — CLEARCUT® SLIT KNIFE INTREPID MICRO-COAXIAL SYSTEM 2.4 SB: Brand: CLEARCUT®; INTREPID

## (undated) DEVICE — ACCESS PLATFORM FOR MINIMALLY INVASIVE SURGERY.: Brand: GELPORT® LAPAROSCOPIC  SYSTEM

## (undated) DEVICE — TROCAR: Brand: KII® OPTICAL ACCESS SYSTEM

## (undated) DEVICE — SLEEVE TRCR L100MM DIA5MM UNIV STBL FOR BLDELSS DIL TIP

## (undated) DEVICE — SYR 10ML LUER LOK 1/5ML GRAD --

## (undated) DEVICE — Device

## (undated) DEVICE — SKIN MARKER,REGULAR TIP WITH RULER AND LABELS: Brand: DEVON

## (undated) DEVICE — Z DISCONTINUED USE 2429233 DRESSING FOAM W10XL10CM 5 LAYR SELF ADH VERSATILE SAFETAC

## (undated) DEVICE — MAYO STAND COVER: Brand: CONVERTORS

## (undated) DEVICE — Z DISCONTINUED USE 2660780 STAPLER INT L28CM DIA33MM CLS STPL H10-2.5MM OPN LEG L5.5MM

## (undated) DEVICE — SUTURE PDS II SZ 1 L36IN ABSRB VLT L48MM CTX 1/2 CIR Z371T

## (undated) DEVICE — RELOAD STPL H4.1X2MM DIA60MM THCK TISS GRN 6 ROW PWR GST B

## (undated) DEVICE — SUT MONOCRYL PLUS UD 4-0 --

## (undated) DEVICE — SOLUTION IRRIGATION H2O 0797305] ICU MEDICAL INC]

## (undated) DEVICE — O.R TOWEL, X-RAY DETECTABLE: Brand: DEROYAL

## (undated) DEVICE — LAP CHOLE: Brand: MEDLINE INDUSTRIES, INC.

## (undated) DEVICE — REM POLYHESIVE ADULT PATIENT RETURN ELECTRODE: Brand: VALLEYLAB

## (undated) DEVICE — INTENDED FOR TISSUE SEPARATION, AND OTHER PROCEDURES THAT REQUIRE A SHARP SURGICAL BLADE TO PUNCTURE OR CUT.: Brand: BARD-PARKER ® CARBON RIB-BACK BLADES

## (undated) DEVICE — DRAIN SURG 15FR L3/16IN SIL RND 3/4 FLUT 3/16IN TRCR

## (undated) DEVICE — GOWN,AURORA,NONRNF,XL,30/CS: Brand: MEDLINE

## (undated) DEVICE — WOUND RETRACTOR AND PROTECTOR: Brand: ALEXIS WOUND PROTECTOR-RETRACTOR

## (undated) DEVICE — 40580 - THE PINK PAD - ADVANCED TRENDELENBURG POSITIONING KIT: Brand: 40580 - THE PINK PAD - ADVANCED TRENDELENBURG POSITIONING KIT

## (undated) DEVICE — APPLIER CLP M L L11.4IN DIA10MM ENDOSCP ROT MULT FOR LIG

## (undated) DEVICE — GLOVE SURG SZ 8 L12IN THK75MIL DK GRN LTX FREE

## (undated) DEVICE — COVADERM PLUS: Brand: DEROYAL

## (undated) DEVICE — TRAY CATH 16FR DRN BG LF -- CONVERT TO ITEM 363158

## (undated) DEVICE — SUTURE VCRL SZ 3-0 L27IN ABSRB UD L26MM SH 1/2 CIR J416H

## (undated) DEVICE — CYTOTOME IRRIG 25GA L16MM ANT CAP BENT

## (undated) DEVICE — BLADE ELECTRODE: Brand: EDGE

## (undated) DEVICE — SYR 50ML LR LCK 1ML GRAD NSAF --

## (undated) DEVICE — GARMENT,MEDLINE,DVT,INT,CALF,MED, GEN2: Brand: MEDLINE

## (undated) DEVICE — SOLUTION IV 1000ML 0.9% SOD CHL

## (undated) DEVICE — NEEDLE HYPO 21GA L1.5IN GRN POLYPR HUB S STL THN WALL IV

## (undated) DEVICE — VISUALIZATION SYSTEM: Brand: CLEARIFY

## (undated) DEVICE — MAJOR: Brand: MEDLINE INDUSTRIES, INC.

## (undated) DEVICE — SPONGE LAP 18X18IN STRL -- 5/PK

## (undated) DEVICE — SUT PROL 2-0 30IN SH BLU --

## (undated) DEVICE — TRAY PREP DRY W/ PREM GLV 2 APPL 6 SPNG 2 UNDPD 1 OVERWRAP

## (undated) DEVICE — CANN VISCOFLOW FRM 27G 22MM --

## (undated) DEVICE — STRAP,POSITIONING,KNEE/BODY,FOAM,4X60": Brand: MEDLINE

## (undated) DEVICE — STAPLER SKIN L440MM 32MM LNG 12 FIRING B FRM PWR + GRIPPING

## (undated) DEVICE — DRAPE TWL SURG 16X26IN BLU ORB04] ALLCARE INC]

## (undated) DEVICE — TROCAR RMFG Z 3RD SLEEVE 5X100 -- LAWSON OEM ITEM 262365 PK/6

## (undated) DEVICE — SUTURE STRATAFIX SPRL SZ 4-0 L12IN ABSRB UD FS-2 L19MM 3/8 SXMP2B409

## (undated) DEVICE — (D)PREP SKN CHLRAPRP APPL 26ML -- CONVERT TO ITEM 371833

## (undated) DEVICE — Z INACTIVE USE 2527070 DRAPE SURG W40XL44IN UNDERBUTTOCK SMS POLYPR W/ PCH BK DISP

## (undated) DEVICE — STERILE POLYISOPRENE POWDER-FREE SURGICAL GLOVES WITH EMOLLIENT COATING: Brand: PROTEXIS

## (undated) DEVICE — MEDI-VAC YANK SUCT HNDL W/TPRD BULBOUS TIP: Brand: CARDINAL HEALTH

## (undated) DEVICE — SOLUTION IRRIGATION BAL SALT SOLUTION 500 ML STRL BSS

## (undated) DEVICE — LEGGINGS, PAIR, 31X48, STERILE: Brand: MEDLINE

## (undated) DEVICE — DERMABOND SKIN ADH 0.7ML --

## (undated) DEVICE — 4-PORT MANIFOLD: Brand: NEPTUNE 2

## (undated) DEVICE — SUTURE VCRL + SZ 3-0 L18IN ABSRB UD SH 1/2 CIR TAPERCUT NDL VCP864D

## (undated) DEVICE — [HIGH FLOW INSUFFLATOR,  DO NOT USE IF PACKAGE IS DAMAGED,  KEEP DRY,  KEEP AWAY FROM SUNLIGHT,  PROTECT FROM HEAT AND RADIOACTIVE SOURCES.]: Brand: PNEUMOSURE

## (undated) DEVICE — DRSG PATCH ANTIMIC 1INX4.0MM -- CONVERT TO ITEM 356053

## (undated) DEVICE — GAUZE,SPONGE,2"X2",8PLY,STERILE,LF,2'S: Brand: MEDLINE

## (undated) DEVICE — DRAPE THER FLUID WARMING 66X44 IN FLAT SLUSH DBL DISC ORS

## (undated) DEVICE — TROCAR LAP L100MM DIA5MM BLDELSS W/ STBL SL ENDOPATH XCEL

## (undated) DEVICE — SEALER TISS L20CM DIA13MM ADV BPLR L CRV JAW OPN APPRCH

## (undated) DEVICE — TROCAR ENDOSCP L100MM DIA12MM BLNT STBL SL DISP ENDOPATH

## (undated) DEVICE — SOLUTION LACTATED RINGERS INJECTION USP